# Patient Record
Sex: FEMALE | Race: WHITE | NOT HISPANIC OR LATINO | Employment: PART TIME | URBAN - METROPOLITAN AREA
[De-identification: names, ages, dates, MRNs, and addresses within clinical notes are randomized per-mention and may not be internally consistent; named-entity substitution may affect disease eponyms.]

---

## 2018-06-21 ENCOUNTER — HOSPITAL ENCOUNTER (OUTPATIENT)
Dept: CT IMAGING | Facility: CLINIC | Age: 73
Discharge: HOME | End: 2018-06-21
Attending: PHYSICIAN ASSISTANT

## 2018-06-21 ENCOUNTER — COMMUNICATION - HEALTHEAST (OUTPATIENT)
Dept: UROLOGY | Facility: CLINIC | Age: 73
End: 2018-06-21

## 2018-06-21 ENCOUNTER — AMBULATORY - HEALTHEAST (OUTPATIENT)
Dept: UROLOGY | Facility: CLINIC | Age: 73
End: 2018-06-21

## 2018-06-21 ENCOUNTER — OFFICE VISIT - HEALTHEAST (OUTPATIENT)
Dept: UROLOGY | Facility: CLINIC | Age: 73
End: 2018-06-21

## 2018-06-21 DIAGNOSIS — N12 PYELONEPHRITIS: ICD-10-CM

## 2018-06-21 DIAGNOSIS — N20.9 URINARY TRACT STONES: ICD-10-CM

## 2018-06-21 DIAGNOSIS — R10.9 RIGHT FLANK PAIN: ICD-10-CM

## 2018-06-21 LAB
ALBUMIN UR-MCNC: ABNORMAL MG/DL
APPEARANCE UR: ABNORMAL
BILIRUB UR QL STRIP: NEGATIVE
COLOR UR AUTO: YELLOW
GLUCOSE UR STRIP-MCNC: NEGATIVE MG/DL
HGB UR QL STRIP: ABNORMAL
KETONES UR STRIP-MCNC: NEGATIVE MG/DL
LEUKOCYTE ESTERASE UR QL STRIP: ABNORMAL
NITRATE UR QL: POSITIVE
PH UR STRIP: 7.5 [PH] (ref 5–8)
SP GR UR STRIP: 1.02 (ref 1–1.03)
UROBILINOGEN UR STRIP-ACNC: ABNORMAL

## 2018-06-21 ASSESSMENT — MIFFLIN-ST. JEOR: SCORE: 1017.98

## 2018-06-23 LAB — BACTERIA SPEC CULT: ABNORMAL

## 2018-08-17 ENCOUNTER — THERAPY VISIT (OUTPATIENT)
Dept: PHYSICAL THERAPY | Facility: CLINIC | Age: 73
End: 2018-08-17
Payer: COMMERCIAL

## 2018-08-17 DIAGNOSIS — M76.62 LEFT ACHILLES TENDINITIS: Primary | ICD-10-CM

## 2018-08-17 PROCEDURE — 97110 THERAPEUTIC EXERCISES: CPT | Mod: GP | Performed by: PHYSICAL THERAPIST

## 2018-08-17 PROCEDURE — 97140 MANUAL THERAPY 1/> REGIONS: CPT | Mod: GP | Performed by: PHYSICAL THERAPIST

## 2018-08-17 PROCEDURE — 97033 APP MDLTY 1+IONTPHRSIS EA 15: CPT | Mod: GP | Performed by: PHYSICAL THERAPIST

## 2018-08-17 PROCEDURE — G8979 MOBILITY GOAL STATUS: HCPCS | Mod: GP | Performed by: PHYSICAL THERAPIST

## 2018-08-17 PROCEDURE — 97161 PT EVAL LOW COMPLEX 20 MIN: CPT | Mod: GP | Performed by: PHYSICAL THERAPIST

## 2018-08-17 PROCEDURE — G8978 MOBILITY CURRENT STATUS: HCPCS | Mod: GP | Performed by: PHYSICAL THERAPIST

## 2018-08-17 NOTE — LETTER
[facility]  [address]  [place] [address]  [phone]    2018    Re: Veda Hicks   :   1945  MRN:  [id number]   REFERRING PHYSICIAN:   [facility]    Date of Initial Evaluation: 2018  Visits:  Rxs Used: 1  Reason for Referral:  Left Achilles tendinitis    EVALUATION SUMMARY    [facility] Initial Evaluation  Subjective:  Patient is a 73 year old female presenting with rehab left ankle/foot hpi.   Veda Hicks is a 73 year old female with a left ankle condition.  Occurance: a bad step.  Condition occurred: at home.  This is a new condition  18 patient received MD orders for PT for left achilles tendon pain that started about 6 weeks ago when she stepped awkwardly.  She has been in a walking boot the last several weeks but is able to taper out of it now..    Patient reports pain:  Posterior (achilles tendon).  Radiates to:  Lower leg.  Pain is described as aching and burning and is intermittent and reported as 3/10.  Associated symptoms:  Loss of motion/stiffness, edema and loss of strength. Pain is the same all the time.  Symptoms are exacerbated by ascending stairs, descending stairs, bending/squatting and walking and relieved by bracing/immobilizing and rest.  Since onset symptoms are gradually improving.  Special tests:  X-ray and MRI.      General health as reported by patient is excellent.  Pertinent medical history includes:  Cancer and chemical dependency. Medical allergies: no. Other surgeries include:  Cancer surgery (many years ago). Current medications:  Other (diuretic).  Current occupation is Addiction counselor.  Patient is working in normal job with restrictions (boot). Primary job tasks include:  Driving and prolonged sitting.  Barriers include:  None as reported by patient.  Red flags:  None as reported by patient.    Objective:  ANKLE:   PROM L PROM R AROM L AROM R MMT L MMT R    Dorsiflexion 10 20 10 20 4/5 5/5   Plantarflexion 35 45 35 45 4/5 5/5   Inversion 30 40 30 40 4/5 5/5   Eversion 10 15 10 15 4/5 5/5   Edema:    General: thickening of left achilles tendon  Palpation: tender throughout left achilles tendon  Gait: presents in CAM boot    Re: Veda Hicks   :   1945          Special Tests:   L R   Anterior Drawer - -   Posterior Drawer - -   Valgus Stress - -   Varus Stress - -   External Rotation - -   Berg's (Achilles) - -   Elkin's - -     Assessment/Plan:    Patient is a 73 year old female with left side ankle complaints.    Patient has the following significant findings with corresponding treatment plan.                Diagnosis 1:  Left achilles tendinopathy    Pain -  hot/cold therapy, US, manual therapy, splint/taping/bracing/orthotics, self management, education and home program  Decreased ROM/flexibility - manual therapy, therapeutic exercise and home program  Decreased strength - therapeutic exercise, therapeutic activities and home program  Decreased proprioception - neuro re-education, therapeutic activities and home program  Inflammation - cold therapy, US, iontophoresis and self management/home program  Edema - cold therapy and self management/home program  Impaired gait - gait training and home program  Decreased function - therapeutic activities and home program    Therapy Evaluation Codes:   1) History comprised of:   Personal factors that impact the plan of care:      None.    Comorbidity factors that impact the plan of care are:      Cancer, Chemical Dependency and Smoking.     Medications impacting care: diuretic.  2) Examination of Body Systems comprised of:   Body structures and functions that impact the plan of care:      Ankle.   Activity limitations that impact the plan of care are:      Squatting/kneeling, Stairs and Walking.  3) Clinical presentation characteristics are:   Stable/Uncomplicated.  4) Decision-Making    Low complexity using  standardized patient assessment instrument and/or measureable assessment of functional outcome.  Cumulative Therapy Evaluation is: Low complexity.    Previous and current functional limitations:  (See Goal Flow Sheet for this information)    Short term and Long term goals: (See Goal Flow Sheet for this information)     Re: Veda Hicks   :   1945          Communication ability:  Patient appears to be able to clearly communicate and understand verbal and written communication and follow directions correctly.  Treatment Explanation - The following has been discussed with the patient:   RX ordered/plan of care  Anticipated outcomes  Possible risks and side effects  This patient would benefit from PT intervention to resume normal activities.   Rehab potential is good.    Frequency:  2 X week, once daily  Duration:  for 6 weeks per MD order  Discharge Plan:  Achieve all LTG.  Independent in home treatment program.  Reach maximal therapeutic benefit.    Thank you for your referral.      INQUIRIES  Therapist: Al San, PT   [facility]  [address] [place] [address]  Phone: [phone]  Fax: [phone]

## 2018-08-17 NOTE — PROGRESS NOTES
[facility] Initial Evaluation  Subjective:  Patient is a 73 year old female presenting with rehab left ankle/foot hpi.   Veda Hicks is a 73 year old female with a left ankle condition.  Occurance: a bad step.  Condition occurred: at home.  This is a new condition  8/8/18 patient received MD orders for PT for left achilles tendon pain that started about 6 weeks ago when she stepped awkwardly.  She has been in a walking boot the last several weeks but is able to taper out of it now..    Patient reports pain:  Posterior (achilles tendon).  Radiates to:  Lower leg.  Pain is described as aching and burning and is intermittent and reported as 3/10.  Associated symptoms:  Loss of motion/stiffness, edema and loss of strength. Pain is the same all the time.  Symptoms are exacerbated by ascending stairs, descending stairs, bending/squatting and walking and relieved by bracing/immobilizing and rest.  Since onset symptoms are gradually improving.  Special tests:  X-ray and MRI.      General health as reported by patient is excellent.  Pertinent medical history includes:  Cancer and chemical dependency.  Medical allergies: no.  Other surgeries include:  Cancer surgery (many years ago).  Current medications:  Other (diuretic).  Current occupation is Addiction counselor.  Patient is working in normal job with restrictions (boot).  Primary job tasks include:  Driving and prolonged sitting.    Barriers include:  None as reported by patient.    Red flags:  None as reported by patient.                        Objective:  ANKLE:     PROM L PROM R AROM L AROM R MMT L MMT R   Dorsiflexion 10 20 10 20 4/5 5/5   Plantarflexion 35 45 35 45 4/5 5/5   Inversion 30 40 30 40 4/5 5/5   Eversion 10 15 10 15 4/5 5/5     Edema:    General: thickening of left achilles tendon      Palpation: tender throughout left achilles tendon    Gait: presents in CAM boot    Special Tests:   L R   Anterior Drawer - -   Posterior Drawer - -    Valgus Stress - -   Varus Stress - -   External Rotation - -   Berg's (Achilles) - -   Elkin's - -             System    Physical Exam    General     ROS    Assessment/Plan:    Patient is a 73 year old female with left side ankle complaints.    Patient has the following significant findings with corresponding treatment plan.                Diagnosis 1:  Left achilles tendinopathy    Pain -  hot/cold therapy, US, manual therapy, splint/taping/bracing/orthotics, self management, education and home program  Decreased ROM/flexibility - manual therapy, therapeutic exercise and home program  Decreased strength - therapeutic exercise, therapeutic activities and home program  Decreased proprioception - neuro re-education, therapeutic activities and home program  Inflammation - cold therapy, US, iontophoresis and self management/home program  Edema - cold therapy and self management/home program  Impaired gait - gait training and home program  Decreased function - therapeutic activities and home program    Therapy Evaluation Codes:   1) History comprised of:   Personal factors that impact the plan of care:      None.    Comorbidity factors that impact the plan of care are:      Cancer, Chemical Dependency and Smoking.     Medications impacting care: diuretic.  2) Examination of Body Systems comprised of:   Body structures and functions that impact the plan of care:      Ankle.   Activity limitations that impact the plan of care are:      Squatting/kneeling, Stairs and Walking.  3) Clinical presentation characteristics are:   Stable/Uncomplicated.  4) Decision-Making    Low complexity using standardized patient assessment instrument and/or measureable assessment of functional outcome.  Cumulative Therapy Evaluation is: Low complexity.    Previous and current functional limitations:  (See Goal Flow Sheet for this information)    Short term and Long term goals: (See Goal Flow Sheet for this information)     Communication  ability:  Patient appears to be able to clearly communicate and understand verbal and written communication and follow directions correctly.  Treatment Explanation - The following has been discussed with the patient:   RX ordered/plan of care  Anticipated outcomes  Possible risks and side effects  This patient would benefit from PT intervention to resume normal activities.   Rehab potential is good.    Frequency:  2 X week, once daily  Duration:  for 6 weeks per MD order  Discharge Plan:  Achieve all LTG.  Independent in home treatment program.  Reach maximal therapeutic benefit.    Please refer to the daily flowsheet for treatment today, total treatment time and time spent performing 1:1 timed codes.

## 2019-09-13 PROBLEM — M76.62 LEFT ACHILLES TENDINITIS: Status: RESOLVED | Noted: 2018-08-17 | Resolved: 2019-09-13

## 2019-09-13 NOTE — PROGRESS NOTES
Patient did not return for follow up PT so current status is unknown.  Refer to initial evaluation for last known objective/subjective information and functional status.  Patient is discharged from PT.

## 2021-01-06 ENCOUNTER — AMBULATORY - HEALTHEAST (OUTPATIENT)
Dept: UROLOGY | Facility: CLINIC | Age: 76
End: 2021-01-06

## 2021-01-06 ENCOUNTER — HOSPITAL ENCOUNTER (OUTPATIENT)
Dept: CT IMAGING | Facility: CLINIC | Age: 76
Discharge: HOME | End: 2021-01-06
Attending: PHYSICIAN ASSISTANT

## 2021-01-06 ENCOUNTER — OFFICE VISIT - HEALTHEAST (OUTPATIENT)
Dept: UROLOGY | Facility: CLINIC | Age: 76
End: 2021-01-06

## 2021-01-06 DIAGNOSIS — N23 RENAL COLIC: ICD-10-CM

## 2021-01-06 DIAGNOSIS — F10.21 ALCOHOL DEPENDENCE IN REMISSION (H): ICD-10-CM

## 2021-01-06 DIAGNOSIS — N20.0 CALCULUS OF KIDNEY: ICD-10-CM

## 2021-01-06 DIAGNOSIS — N20.1 CALCULUS OF URETER: ICD-10-CM

## 2021-01-07 ENCOUNTER — COMMUNICATION - HEALTHEAST (OUTPATIENT)
Dept: UROLOGY | Facility: CLINIC | Age: 76
End: 2021-01-07

## 2021-01-07 DIAGNOSIS — N20.0 CALCULUS OF KIDNEY: ICD-10-CM

## 2021-01-08 ENCOUNTER — AMBULATORY - HEALTHEAST (OUTPATIENT)
Dept: LAB | Facility: HOSPITAL | Age: 76
End: 2021-01-08

## 2021-01-08 ENCOUNTER — OFFICE VISIT - HEALTHEAST (OUTPATIENT)
Dept: UROLOGY | Facility: CLINIC | Age: 76
End: 2021-01-08

## 2021-01-08 ENCOUNTER — HOSPITAL ENCOUNTER (OUTPATIENT)
Dept: CT IMAGING | Facility: HOSPITAL | Age: 76
Discharge: HOME | End: 2021-01-08

## 2021-01-08 DIAGNOSIS — R82.81 PYURIA: ICD-10-CM

## 2021-01-08 DIAGNOSIS — N23 RENAL COLIC: ICD-10-CM

## 2021-01-08 DIAGNOSIS — F10.21 ALCOHOL DEPENDENCE IN REMISSION (H): ICD-10-CM

## 2021-01-08 DIAGNOSIS — N18.30 ACUTE WORSENING OF STAGE 3 CHRONIC KIDNEY DISEASE (H): ICD-10-CM

## 2021-01-08 DIAGNOSIS — N20.0 CALCULUS OF KIDNEY: ICD-10-CM

## 2021-01-08 DIAGNOSIS — N18.30 CRF (CHRONIC RENAL FAILURE), STAGE 3 (MODERATE) (H): ICD-10-CM

## 2021-01-08 DIAGNOSIS — N20.1 CALCULUS OF URETER: ICD-10-CM

## 2021-01-08 LAB
ALBUMIN UR-MCNC: ABNORMAL MG/DL
AMORPH CRY #/AREA URNS HPF: ABNORMAL /[HPF]
ANION GAP SERPL CALCULATED.3IONS-SCNC: 10 MMOL/L (ref 5–18)
APPEARANCE UR: ABNORMAL
BACTERIA #/AREA URNS HPF: ABNORMAL HPF
BILIRUB UR QL STRIP: NEGATIVE
BUN SERPL-MCNC: 23 MG/DL (ref 8–28)
CALCIUM SERPL-MCNC: 9 MG/DL (ref 8.5–10.5)
CHLORIDE BLD-SCNC: 116 MMOL/L (ref 98–107)
CO2 SERPL-SCNC: 18 MMOL/L (ref 22–31)
COLOR UR AUTO: YELLOW
CREAT SERPL-MCNC: 2.06 MG/DL (ref 0.6–1.1)
GFR SERPL CREATININE-BSD FRML MDRD: 24 ML/MIN/1.73M2
GLUCOSE BLD-MCNC: 76 MG/DL (ref 70–125)
GLUCOSE UR STRIP-MCNC: NEGATIVE MG/DL
HGB UR QL STRIP: ABNORMAL
KETONES UR STRIP-MCNC: NEGATIVE MG/DL
LEUKOCYTE ESTERASE UR QL STRIP: ABNORMAL
MUCOUS THREADS #/AREA URNS LPF: ABNORMAL LPF
NITRATE UR QL: NEGATIVE
PH UR STRIP: 6.5 [PH] (ref 4.5–8)
POTASSIUM BLD-SCNC: 3.8 MMOL/L (ref 3.5–5)
RBC #/AREA URNS AUTO: ABNORMAL HPF
SODIUM SERPL-SCNC: 144 MMOL/L (ref 136–145)
SP GR UR STRIP: 1.01 (ref 1–1.03)
SQUAMOUS #/AREA URNS AUTO: >100 LPF
UROBILINOGEN UR STRIP-ACNC: ABNORMAL
WBC #/AREA URNS AUTO: >100 HPF

## 2021-01-09 LAB — BACTERIA SPEC CULT: NO GROWTH

## 2021-01-10 ENCOUNTER — COMMUNICATION - HEALTHEAST (OUTPATIENT)
Dept: UROLOGY | Facility: CLINIC | Age: 76
End: 2021-01-10

## 2021-01-15 ENCOUNTER — COMMUNICATION - HEALTHEAST (OUTPATIENT)
Dept: UROLOGY | Facility: CLINIC | Age: 76
End: 2021-01-15

## 2021-01-29 ENCOUNTER — AMBULATORY - HEALTHEAST (OUTPATIENT)
Dept: NURSING | Facility: CLINIC | Age: 76
End: 2021-01-29

## 2021-02-19 ENCOUNTER — AMBULATORY - HEALTHEAST (OUTPATIENT)
Dept: NURSING | Facility: CLINIC | Age: 76
End: 2021-02-19

## 2021-05-31 ENCOUNTER — RECORDS - HEALTHEAST (OUTPATIENT)
Dept: ADMINISTRATIVE | Facility: CLINIC | Age: 76
End: 2021-05-31

## 2021-06-01 ENCOUNTER — RECORDS - HEALTHEAST (OUTPATIENT)
Dept: ADMINISTRATIVE | Facility: CLINIC | Age: 76
End: 2021-06-01

## 2021-06-01 VITALS — WEIGHT: 107 LBS | BODY MASS INDEX: 16.79 KG/M2 | HEIGHT: 67 IN

## 2021-06-14 NOTE — TELEPHONE ENCOUNTER
Called pt, left message , urine cult no growth see after results of litholink studies back- pt to call if she has more pain

## 2021-06-14 NOTE — TELEPHONE ENCOUNTER
Patient returned call and was given message about test kits coming to her home.  Nasima Pacheco RN    
No

## 2021-06-14 NOTE — TELEPHONE ENCOUNTER
Per provider, patient to have a CT regular dose to evaluate.  Order placed. Spoke with patient and was able to schedule her CT and f/u call.  She will go into the lab also at that time and have her blood drawn and urine dropped off.  Nasima Pacheco RN

## 2021-06-14 NOTE — PROGRESS NOTES
Assessment/Plan:        Diagnoses and all orders for this visit:    Pyuria  -     Culture, Urine- Standing; Standing    Acute worsening of stage 3 chronic kidney disease    Calculus of kidney  Litholink 24-hour urine studies x2    Stone Management Plan  [facility] Stone Management 6/21/2018 1/6/2021 1/8/2021   Urinary Tract Infection Suspected Infection No suspicion of infection Possible Infection   Renal Colic Well controlled symptoms Well controlled symptoms Well controlled symptoms   Renal Failure No suspicion of renal failure No suspicion of renal failure Chronic renal failure   Chronic Renal Failure - - 15-30ml/min/1.73m2   Current CT date 6/21/2018 1/6/2021 1/8/2021   Right sided stones? No Yes Yes   R Number of ureteral stones - No ureteral stones No ureteral stones   R Number of kidney stones  - Renal stones unchanged from last exam 2   R GSD of kidney stones - - < 2   R Hydronephrosis - - Mild   R Stone Event No current event No current event No current event   Diagnosis date - - -   Initial location of primary symptomatic stone - - -   Initial GSD of primary symptomatic stone - - -   Resolved date - - -   R Post-op status - - -   R Current Plan - - Observe   Clear rationale - - -   Observe rationale - - Limited stone burden with good prognosis for spontaneous passage   Left sided stones? No Yes Yes   L Number of ureteral stones - (No Data) No ureteral stones   L GSD of ureteral stones - (No Data) -   L Location of ureteral stone - - -   L Number of kidney stones  - 5 8   L GSD of kidney stones - 4 - 10 (No Data)   L Hydronephrosis - (No Data) None   L Stone Event No current event (No Data) -   Diagnosis date - - -   Initial location of primary symptomatic stone - - -   Initial GSD of primary symptomatic stone - - -   Resolved date - - -   Post-op status - - -   L MET Status - - -   L Current Plan - - Observe   MET - - -   Clear rationale - - -   Observe rationale - - Limited stone burden with good prognosis for  "spontaneous passage             Phone call duration: 6 minutes    Harlan Rubio MD     Subjective:      The patient has been notified of following:     \"This telephone visit will be conducted via a call between you and your physician/provider. We have found that certain health care needs can be provided without the need for a physical exam.  This service lets us provide the care you need with a short phone conversation.  If a prescription is necessary we can send it directly to your pharmacy.  If labs and/or imaging are needed, we can place orders so you can have the test (s) done at a later time.    If during the course of the call the physician/provider feels a telephone visit is not appropriate, you will not be charged for this service.\"     HPI  Ms. Veda Hicks is a 76 y.o.  female who is being evaluated via a billable telephone visit by [facility] for follow-up on visit from 1/6/2021.  At that time patient was having left flank pain.  CT scan was indeterminate by my reading.  Radiology reported no evidence of hydro no evidence of ureteral stone.  There appeared to be a bit of a hydro on my side.  Asked radiology to review it and the radiologist who who reviewed it yesterday morning thought there was a left ureteral stone and there was some hydronephrosis.    Subsequently had a full dose CT accomplished today.  Personal review of CT full dose without contrast obtained today 1/8/2021 demonstrated 8 to 10 stones in the left hip kidney with a cluster of stones in the lower pole and the largest stone being 4 mm.  Right kidney contained Pap tip stones 1 to 3 mm x 2 or 3.  There was no sign of hydronephrosis today and no ureteral stone.    Radiology report stated stone from 1/6/2021 with hydronephrosis was now gone.    Called patient and she  said her pain is better.    Urine analysis from this morning demonstrated specific gravity 1.013 pH 6.5 small blood negative nitrate large " leukocyte few bacteria 10-25 RBCs greater than 100 WBCs.    BMP demonstrated sodium 144 potassium 3.8 chloride 116 CO2 18 glucose 76 calcium 9.0 creatinine 2.06 BUN 23.    Creatinine has gone from 1.27 in 2015-1.8 in 2016 to the current 2.06 and 1/8/2021.    Patient has been followed by nephrology with creatinines in August 2018 2.1 January 2019 1.95 June 2019 2.0.    Impression known bilateral renal stones left more so than right   Recent passage of the left ureteral stone not recovered  Pyuria etiology unclear  CRF stage III    Plan patient is a known stone former with history of stone risk studies in the remote past.  Recommended we proceed with repeat Litholink 24-hour urine studies.  With pyuria will have urine culture done on specimen from this morning.    Patient was comfortable with the above.             ROS   Review of systems is negative except for HPI.    Past Medical History:   Diagnosis Date     Cancer (H) 1978    Cervical     COPD (chronic obstructive pulmonary disease) (H)      H/O alcohol dependence (H)      Kidney calculus      Kidney stone      Pseudomembranous colitis      Tobacco abuse      Uterine cancer (H)        Past Surgical History:   Procedure Laterality Date     HYSTERECTOMY  1978     LITHOTRIPSY       NEPHROLITHOTOMY       open kidney surgery       URETEROSCOPY         Current Outpatient Medications   Medication Sig Dispense Refill     ondansetron (ZOFRAN-ODT) 4 MG disintegrating tablet Take 1 tablet (4 mg total) by mouth every 6 (six) hours as needed for nausea. 10 tablet 1     oxyCODONE (ROXICODONE) 5 MG immediate release tablet Take 1 tablet (5 mg total) by mouth every 4 (four) hours as needed for pain. 10 tablet 0     No current facility-administered medications for this visit.        No Known Allergies    Social History     Socioeconomic History     Marital status:      Spouse name: Not on file     Number of children: Not on file     Years of education: Not on file      Highest education level: Not on file   Occupational History     Occupation: [employer]     Employer: [employer]   Social Needs     Financial resource strain: Not on file     Food insecurity     Worry: Not on file     Inability: Not on file     Transportation needs     Medical: Not on file     Non-medical: Not on file   Tobacco Use     Smoking status: Current Every Day Smoker     Packs/day: 1.00     Years: 47.00     Pack years: 47.00     Smokeless tobacco: Never Used   Substance and Sexual Activity     Alcohol use: No     Drug use: No     Sexual activity: Not on file   Lifestyle     Physical activity     Days per week: Not on file     Minutes per session: Not on file     Stress: Not on file   Relationships     Social connections     Talks on phone: Not on file     Gets together: Not on file     Attends Sabianism service: Not on file     Active member of club or organization: Not on file     Attends meetings of clubs or organizations: Not on file     Relationship status: Not on file     Intimate partner violence     Fear of current or ex partner: Not on file     Emotionally abused: Not on file     Physically abused: Not on file     Forced sexual activity: Not on file   Other Topics Concern     Not on file   Social History Narrative     Not on file       Family History   Problem Relation Age of Onset     Cancer Father         lung     Prostate cancer Brother      Prostate cancer Maternal Grandmother         kidney     Prostate cancer Maternal Grandfather         kidney     Heart disease Paternal Grandfather        Objective:      Labs   Urinalysis POC (Office):  Blood UA   Date Value Ref Range Status   12/19/2016 Large Negative Final   12/06/2016 Moderate Negative Final   01/28/2016 Trace Negative Final     Nitrite, UA   Date Value Ref Range Status   01/08/2021 Negative Negative Final   06/21/2018 Positive (!) Negative Final   12/19/2016 Negative Negative Final   12/06/2016 Negative Negative Final    01/28/2016 Negative Negative Final   05/26/2015 Negative Negative Final     Leukocytes, UA    Date Value Ref Range Status   12/19/2016 Small (!) Negative Final   12/06/2016 Trace (!) Negative Final   01/28/2016 Negative Negative Final     pH UA   Date Value Ref Range Status   12/19/2016 6.5 4.5 - 8.0 Final   12/06/2016 6.0 4.5 - 8.0 Final   01/28/2016 6.0 4.5 - 8.0 Final       Lab Urinalysis:  Blood, UA   Date Value Ref Range Status   01/08/2021 Small (!) Negative Final   06/21/2018 Large (!) Negative Final   05/26/2015 Negative Negative Final     Nitrite, UA   Date Value Ref Range Status   01/08/2021 Negative Negative Final   06/21/2018 Positive (!) Negative Final   12/19/2016 Negative Negative Final   12/06/2016 Negative Negative Final   01/28/2016 Negative Negative Final   05/26/2015 Negative Negative Final     Leukocytes, UA   Date Value Ref Range Status   01/08/2021 Large (!) Negative Final   06/21/2018 Large (!) Negative Final   05/26/2015 Negative Negative Final     pH, UA   Date Value Ref Range Status   01/08/2021 6.5 4.5 - 8.0 Final   06/21/2018 7.5 5.0 - 8.0 Final   05/26/2015 6.0 4.5 - 8.0 Final    and Acute Labs   Renal Panel  [facility]  Creatinine   Date Value Ref Range Status   01/08/2021 2.06 (H) 0.60 - 1.10 mg/dL Final   12/09/2016 1.80 (H) 0.60 - 1.10 mg/dL Final   05/28/2015 1.27 (H) 0.60 - 1.10 mg/dL Final     Potassium   Date Value Ref Range Status   01/08/2021 3.8 3.5 - 5.0 mmol/L Final   12/09/2016 3.4 (L) 3.5 - 5.0 mmol/L Final   05/28/2015 3.7 3.5 - 5.0 mmol/L Final     Calcium   Date Value Ref Range Status   01/08/2021 9.0 8.5 - 10.5 mg/dL Final   12/09/2016 9.4 8.5 - 10.5 mg/dL Final   05/28/2015 8.0 (L) 8.5 - 10.5 mg/dL Final

## 2021-06-14 NOTE — PROGRESS NOTES
Assessment/Plan:        Diagnoses and all orders for this visit:    Renal colic  -     ondansetron (ZOFRAN-ODT) 4 MG disintegrating tablet; Take 1 tablet (4 mg total) by mouth every 6 (six) hours as needed for nausea.  Dispense: 10 tablet; Refill: 1  -     oxyCODONE (ROXICODONE) 5 MG immediate release tablet; Take 1 tablet (5 mg total) by mouth every 4 (four) hours as needed for pain.  Dispense: 10 tablet; Refill: 0  -     Urinalysis Macro & Micro; Standing  -     Basic Metabolic Panel; Standing    Alcohol dependence in remission (H)  -     ondansetron (ZOFRAN-ODT) 4 MG disintegrating tablet; Take 1 tablet (4 mg total) by mouth every 6 (six) hours as needed for nausea.  Dispense: 10 tablet; Refill: 1  -     oxyCODONE (ROXICODONE) 5 MG immediate release tablet; Take 1 tablet (5 mg total) by mouth every 4 (four) hours as needed for pain.  Dispense: 10 tablet; Refill: 0  -     Urinalysis Macro & Micro; Standing  -     Basic Metabolic Panel; Standing    Calculus of kidney  -     ondansetron (ZOFRAN-ODT) 4 MG disintegrating tablet; Take 1 tablet (4 mg total) by mouth every 6 (six) hours as needed for nausea.  Dispense: 10 tablet; Refill: 1  -     oxyCODONE (ROXICODONE) 5 MG immediate release tablet; Take 1 tablet (5 mg total) by mouth every 4 (four) hours as needed for pain.  Dispense: 10 tablet; Refill: 0  -     Urinalysis Macro & Micro; Standing  -     Basic Metabolic Panel; Standing    Calculus of ureter  -     ondansetron (ZOFRAN-ODT) 4 MG disintegrating tablet; Take 1 tablet (4 mg total) by mouth every 6 (six) hours as needed for nausea.  Dispense: 10 tablet; Refill: 1  -     oxyCODONE (ROXICODONE) 5 MG immediate release tablet; Take 1 tablet (5 mg total) by mouth every 4 (four) hours as needed for pain.  Dispense: 10 tablet; Refill: 0  -     Urinalysis Macro & Micro; Standing  -     Basic Metabolic Panel; Standing    Other orders  -     Discontinue: hydroCHLOROthiazide (HYDRODIURIL) 25 MG tablet      Stone  "Management Plan  [facility] Stone Management 12/19/2016 6/21/2018 1/6/2021   Urinary Tract Infection No suspicion of infection Suspected Infection No suspicion of infection   Renal Colic Inadequate outpatient management of symptoms Well controlled symptoms Well controlled symptoms   Renal Failure No suspicion of renal failure No suspicion of renal failure No suspicion of renal failure   Chronic Renal Failure - - -   Current CT date - 6/21/2018 1/6/2021   Right sided stones? - No Yes   R Number of ureteral stones - - No ureteral stones   R Number of kidney stones  - - Renal stones unchanged from last exam   R GSD of kidney stones - - -   R Hydronephrosis - - -   R Stone Event No current event No current event No current event   Diagnosis date - - -   Initial location of primary symptomatic stone - - -   Initial GSD of primary symptomatic stone - - -   Resolved date - - -   R Post-op status - - -   R Current Plan - - -   Clear rationale - - -   Observe rationale - - -   Left sided stones? - No Yes   L Number of ureteral stones - - (No Data)   L GSD of ureteral stones - - (No Data)   L Location of ureteral stone - - -   L Number of kidney stones  - - 5   L GSD of kidney stones - - 4 - 10   L Hydronephrosis - - (No Data)   L Stone Event Established event No current event (No Data)   Diagnosis date - - -   Initial location of primary symptomatic stone - - -   Initial GSD of primary symptomatic stone - - -   Resolved date - - -   Post-op status Stent Removal - -   L MET Status - - -   L Current Plan - - -   MET - - -   Clear rationale - - -   Observe rationale - - -             Phone call duration: 7 minutes    Harlan Rubio MD     Subjective:      The patient has been notified of following:     \"This telephone visit will be conducted via a call between you and your physician/provider. We have found that certain health care needs can be provided without the need for a physical exam.  This service lets us provide the care " "you need with a short phone conversation.  If a prescription is necessary we can send it directly to your pharmacy.  If labs and/or imaging are needed, we can place orders so you can have the test (s) done at a later time.    If during the course of the call the physician/provider feels a telephone visit is not appropriate, you will not be charged for this service.\"     HPI  Ms. Veda Hicks is a 76 y.o.  female who is being evaluated via a billable telephone visit by [facility] [place] with long history of stones recurrent risk factors unknown.  Patient had onset of left flank pain today for which she took ibuprofen with some relief.  She had some mild nausea associated with it.  She stated it was reminiscent of her previous stones.    Personal review of CT scan without contrast low-dose obtained today 1/6/2021 demonstrated a 2 mm stone in the right kidney with 5 stones on the left 2 of which were 2 mm each along with a 5 mm stone and then 2 stones that overlapped 1 3.5 mm the other 4.7 mm in the lower pole.  It appeared that there was either parapelvic cysts or hydronephrosis but I am uncertain.  Postop hip gave scatter made it more confusing for evaluation of ureter in this area  Plan to review with radiologist.  There was no evidence of a ureteral stone that I could see possibly some mild ureterectasis on the left.    Impression bilateral renal stones  Left flank pain reminiscent of stone by patient history possible small ureteral stone that we cannot identify    Plan review CT with radiology have patient get UA and BMP which she says she can do on Friday.   Patient given 10 oxycodone and Zofran for symptom control to use in addition to her ibuprofen.  Will call patient tomorrow with results of consultation with radiology.    1/7/2020 addendum;  Spoke with radiologist doing CT at [facility] this morning at around 08 40.  Asked him to look at films as I was concerned about possibility of " hydronephrosis but did not have sufficient clarity on the CT that I had.    He reviewed the CT and concurred that it appeared to be a hydro and he thought he could see left ureteral stone.  Told him I would reorder full dose CT to see if we could get better definition of her circumstance in view of the left flank pain.  At the time of this dictation I am in the process of trying to reach the patient by phone.  At the moment she does not answer.     ROS   Review of systems is negative except for HPI.    Past Medical History:   Diagnosis Date     Cancer (H) 1978    Cervical     COPD (chronic obstructive pulmonary disease) (H)      H/O alcohol dependence (H)      Kidney calculus      Kidney stone      Pseudomembranous colitis      Tobacco abuse      Uterine cancer (H)        Past Surgical History:   Procedure Laterality Date     HYSTERECTOMY  1978     LITHOTRIPSY       NEPHROLITHOTOMY       open kidney surgery       URETEROSCOPY         Current Outpatient Medications   Medication Sig Dispense Refill     ondansetron (ZOFRAN-ODT) 4 MG disintegrating tablet Take 1 tablet (4 mg total) by mouth every 6 (six) hours as needed for nausea. 10 tablet 1     oxyCODONE (ROXICODONE) 5 MG immediate release tablet Take 1 tablet (5 mg total) by mouth every 4 (four) hours as needed for pain. 10 tablet 0     No current facility-administered medications for this visit.        No Known Allergies    Social History     Socioeconomic History     Marital status:      Spouse name: Not on file     Number of children: Not on file     Years of education: Not on file     Highest education level: Not on file   Occupational History     Occupation: conselor     Employer: [employer]   Social Needs     Financial resource strain: Not on file     Food insecurity     Worry: Not on file     Inability: Not on file     Transportation needs     Medical: Not on file     Non-medical: Not on file   Tobacco Use     Smoking status:  Current Every Day Smoker     Packs/day: 1.00     Years: 47.00     Pack years: 47.00     Smokeless tobacco: Never Used   Substance and Sexual Activity     Alcohol use: No     Drug use: No     Sexual activity: Not on file   Lifestyle     Physical activity     Days per week: Not on file     Minutes per session: Not on file     Stress: Not on file   Relationships     Social connections     Talks on phone: Not on file     Gets together: Not on file     Attends Yarsani service: Not on file     Active member of club or organization: Not on file     Attends meetings of clubs or organizations: Not on file     Relationship status: Not on file     Intimate partner violence     Fear of current or ex partner: Not on file     Emotionally abused: Not on file     Physically abused: Not on file     Forced sexual activity: Not on file   Other Topics Concern     Not on file   Social History Narrative     Not on file       Family History   Problem Relation Age of Onset     Cancer Father         lung     Prostate cancer Brother      Prostate cancer Maternal Grandmother         kidney     Prostate cancer Maternal Grandfather         kidney     Heart disease Paternal Grandfather        Objective:      Labs

## 2021-06-18 NOTE — PROGRESS NOTES
Assessment/Plan:        Diagnoses and all orders for this visit:    Pyelonephritis  -     Discontinue: levoFLOXacin (LEVAQUIN) 500 MG tablet; Take 1 tablet (500 mg total) by mouth daily for 10 days.  Dispense: 10 tablet; Refill: 0  -     levoFLOXacin (LEVAQUIN) 500 MG tablet; Take 1 tablet (500 mg total) by mouth daily for 10 days.  Dispense: 10 tablet; Refill: 0    Urinary tract stones  -     Urinalysis Macroscopic  -     Culture, Urine- Future; Future; Expected date: 7/21/18  -     Culture, Urine- Future      Stone Management Plan  \[facility]\"" Stone Management 12/6/2016 12/19/2016 6/21/2018   Urinary Tract Infection No suspicion of infection No suspicion of infection Suspected Infection   Renal Colic Well controlled symptoms Inadequate outpatient management of symptoms Well controlled symptoms   Renal Failure No suspicion of renal failure No suspicion of renal failure No suspicion of renal failure   Chronic Renal Failure - - -   Current CT date 12/6/2016 - 6/21/2018   Right sided stones? Yes - No   R Number of ureteral stones No ureteral stones - -   R Number of kidney stones  2 - -   R GSD of kidney stones < 2 - -   R Hydronephrosis None - -   R Stone Event No current event No current event No current event   Diagnosis date - - -   Initial location of primary symptomatic stone - - -   Initial GSD of primary symptomatic stone - - -   Resolved date - - -   R Post-op status - - -   R Current Plan Observe - -   Clear rationale - - -   Observe rationale Limited stone burden with good prognosis for spontaneous passage - -   Left sided stones? Yes - No   L Number of ureteral stones 1 - -   L GSD of ureteral stones 4 - -   L Location of ureteral stone Distal - -   L Number of kidney stones  4 - -   L GSD of kidney stones 2 - 4 - -   L Hydronephrosis Severe - -   L Stone Event New event Established event No current event   Diagnosis date 12/6/2016 - -   Initial location of primary symptomatic stone Distal - -   Initial GSD of  primary symptomatic stone 4 - -   Resolved date - - -   Post-op status - Stent Removal -   L MET Status - - -   L Current Plan Clear - -   MET - - -   Clear rationale Poor prognosis - -   Observe rationale - - -             Subjective:      HPI  MsSummer Hicks is a 73 y.o.  female returning to the [facility] [place] for follow up of her stone disease.    Veda has had her stone disease in good control through aggressive hydration for the last two years. She has been having some right flank pain for 2-3 days. She has had hematuria. She thought she might be coming down with UTI last week. Urine has been foul smelling with some dysuria. No fever or chills.    Urine is strongly nitrite positive and is sent for culture.    CT from today demonstrates absence of any stone.    She does have significant right flank pain and I am concerned that she is on the verge of developing full blown pyelonephritis.    I prescribed levaquin and will follow with culture results when they are available. Will see her back in clinic in one month to confirm resolution.     ROS   Review of systems is negative except for HPI.    Past Medical History:   Diagnosis Date     Cancer (H) 1978    Cervical     COPD (chronic obstructive pulmonary disease) (H)      H/O alcohol dependence (H)      Kidney calculus      Kidney stone      Pseudomembranous colitis      Tobacco abuse      Uterine cancer (H)        Past Surgical History:   Procedure Laterality Date     HYSTERECTOMY  1978     LITHOTRIPSY       NEPHROLITHOTOMY       open kidney surgery       URETEROSCOPY         Current Outpatient Prescriptions   Medication Sig Dispense Refill     ibuprofen (ADVIL,MOTRIN) 200 MG tablet Take 200 mg by mouth every 6 (six) hours as needed for pain.       levoFLOXacin (LEVAQUIN) 500 MG tablet Take 1 tablet (500 mg total) by mouth daily for 10 days. 10 tablet 0     No current facility-administered medications for this visit.        No Known  Allergies    Social History     Social History     Marital status:      Spouse name: N/A     Number of children: N/A     Years of education: N/A     Occupational History     [employer]     Social History Main Topics     Smoking status: Current Every Day Smoker     Packs/day: 1.00     Years: 47.00     Smokeless tobacco: Never Used     Alcohol use No     Drug use: No     Sexual activity: Not on file     Other Topics Concern     Not on file     Social History Narrative       Family History   Problem Relation Age of Onset     Cancer Father      lung     Prostate cancer Brother      Prostate cancer Maternal Grandmother      kidney     Prostate cancer Maternal Grandfather      kidney     Heart disease Paternal Grandfather      Objective:      Physical Exam  Vitals:    06/21/18 1505   BP: 109/55   Pulse: 86   Temp: 97.7  F (36.5  C)     General - well developed, well nourished, appropriate for age. Appears no distress at this time  Abdomen - cachetic soft, non-tender, no hepatosplenomegaly, no masses.   - right flank moderate tenderness, no suprapubic tenderness, kidney and bladder non-palpable  MSK - normal spinal curvature. no spinal tenderness. normal gait. muscular strength intact.  Psych - oriented to time, place, and person, normal mood and affect.      Labs   Urinalysis POC (Office):  Blood UA   Date Value Ref Range Status   12/19/2016 Large Negative Final   12/06/2016 Moderate Negative Final   01/28/2016 Trace Negative Final     Nitrite, UA   Date Value Ref Range Status   06/21/2018 Positive (!) Negative Final   12/19/2016 Negative Negative Final   12/06/2016 Negative Negative Final   01/28/2016 Negative Negative Final   05/26/2015 Negative Negative Final   05/21/2015 Negative Negative Final     Leukocytes, UA    Date Value Ref Range Status   12/19/2016 Small (!) Negative Final   12/06/2016 Trace (!) Negative Final   01/28/2016 Negative Negative Final     pH UA   Date Value Ref  Range Status   12/19/2016 6.5 4.5 - 8.0 Final   12/06/2016 6.0 4.5 - 8.0 Final   01/28/2016 6.0 4.5 - 8.0 Final       Lab Urinalysis:  Blood, UA   Date Value Ref Range Status   06/21/2018 Large (!) Negative Final   05/26/2015 Negative Negative Final   05/21/2015 Moderate (!) Negative Final     Nitrite, UA   Date Value Ref Range Status   06/21/2018 Positive (!) Negative Final   12/19/2016 Negative Negative Final   12/06/2016 Negative Negative Final   01/28/2016 Negative Negative Final   05/26/2015 Negative Negative Final   05/21/2015 Negative Negative Final     Leukocytes, UA   Date Value Ref Range Status   06/21/2018 Large (!) Negative Final   05/26/2015 Negative Negative Final   05/21/2015 Large (!) Negative Final     pH, UA   Date Value Ref Range Status   06/21/2018 7.5 5.0 - 8.0 Final   05/26/2015 6.0 4.5 - 8.0 Final   05/21/2015 6.0 4.5 - 8.0 Final    and Acute Labs   Urine Culture    Culture   Date Value Ref Range Status   12/19/2016 No Growth  Final   01/27/2015 No Growth  Final   12/29/2014 10,000-50,000 col/ml Klebsiella pneumoniae (!)  Final

## 2021-09-11 ENCOUNTER — HEALTH MAINTENANCE LETTER (OUTPATIENT)
Age: 76
End: 2021-09-11

## 2022-06-04 ENCOUNTER — APPOINTMENT (OUTPATIENT)
Dept: ULTRASOUND IMAGING | Facility: CLINIC | Age: 77
DRG: 872 | End: 2022-06-04
Attending: EMERGENCY MEDICINE
Payer: COMMERCIAL

## 2022-06-04 ENCOUNTER — HOSPITAL ENCOUNTER (INPATIENT)
Facility: CLINIC | Age: 77
LOS: 7 days | Discharge: HOME | DRG: 872 | End: 2022-06-11
Attending: EMERGENCY MEDICINE | Admitting: INTERNAL MEDICINE
Payer: COMMERCIAL

## 2022-06-04 ENCOUNTER — APPOINTMENT (OUTPATIENT)
Dept: CT IMAGING | Facility: CLINIC | Age: 77
DRG: 872 | End: 2022-06-04
Attending: EMERGENCY MEDICINE
Payer: COMMERCIAL

## 2022-06-04 DIAGNOSIS — N18.9 CHRONIC RENAL IMPAIRMENT, UNSPECIFIED CKD STAGE: ICD-10-CM

## 2022-06-04 DIAGNOSIS — K81.0 ACUTE CHOLECYSTITIS: ICD-10-CM

## 2022-06-04 DIAGNOSIS — Z72.0 TOBACCO ABUSE: ICD-10-CM

## 2022-06-04 DIAGNOSIS — D72.829 LEUKOCYTOSIS, UNSPECIFIED TYPE: ICD-10-CM

## 2022-06-04 DIAGNOSIS — A04.72 C. DIFFICILE COLITIS: Primary | ICD-10-CM

## 2022-06-04 DIAGNOSIS — K51.00 PANCOLITIS (H): ICD-10-CM

## 2022-06-04 DIAGNOSIS — E53.8 FOLATE DEFICIENCY: ICD-10-CM

## 2022-06-04 LAB
ACANTHOCYTES BLD QL SMEAR: ABNORMAL
ALBUMIN SERPL-MCNC: 2.5 G/DL (ref 3.4–5)
ALP SERPL-CCNC: 98 U/L (ref 40–150)
ALT SERPL W P-5'-P-CCNC: 9 U/L (ref 0–50)
ANION GAP SERPL CALCULATED.3IONS-SCNC: 14 MMOL/L (ref 3–14)
ANION GAP SERPL CALCULATED.3IONS-SCNC: 15 MMOL/L (ref 3–14)
AST SERPL W P-5'-P-CCNC: 19 U/L (ref 0–45)
ATRIAL RATE - MUSE: 93 BPM
BASOPHILS # BLD MANUAL: 0 10E3/UL (ref 0–0.2)
BASOPHILS NFR BLD MANUAL: 0 %
BILIRUB SERPL-MCNC: 0.2 MG/DL (ref 0.2–1.3)
BUN SERPL-MCNC: 40 MG/DL (ref 7–30)
BUN SERPL-MCNC: 41 MG/DL (ref 7–30)
CALCIUM SERPL-MCNC: 10.2 MG/DL (ref 8.5–10.1)
CALCIUM SERPL-MCNC: 9.1 MG/DL (ref 8.5–10.1)
CHLORIDE BLD-SCNC: 116 MMOL/L (ref 94–109)
CHLORIDE BLD-SCNC: 120 MMOL/L (ref 94–109)
CO2 SERPL-SCNC: 10 MMOL/L (ref 20–32)
CO2 SERPL-SCNC: 11 MMOL/L (ref 20–32)
CREAT SERPL-MCNC: 2.61 MG/DL (ref 0.52–1.04)
CREAT SERPL-MCNC: 2.79 MG/DL (ref 0.52–1.04)
DIASTOLIC BLOOD PRESSURE - MUSE: NORMAL MMHG
EOSINOPHIL # BLD MANUAL: 0.3 10E3/UL (ref 0–0.7)
EOSINOPHIL NFR BLD MANUAL: 1 %
ERYTHROCYTE [DISTWIDTH] IN BLOOD BY AUTOMATED COUNT: 17 % (ref 10–15)
GFR SERPL CREATININE-BSD FRML MDRD: 17 ML/MIN/1.73M2
GFR SERPL CREATININE-BSD FRML MDRD: 18 ML/MIN/1.73M2
GLUCOSE BLD-MCNC: 69 MG/DL (ref 70–99)
GLUCOSE BLD-MCNC: 94 MG/DL (ref 70–99)
HCT VFR BLD AUTO: 37.5 % (ref 35–47)
HGB BLD-MCNC: 12.1 G/DL (ref 11.7–15.7)
INTERPRETATION ECG - MUSE: NORMAL
LACTATE SERPL-SCNC: 1.5 MMOL/L (ref 0.7–2)
LIPASE SERPL-CCNC: 63 U/L (ref 73–393)
LYMPHOCYTES # BLD MANUAL: 0.6 10E3/UL (ref 0.8–5.3)
LYMPHOCYTES NFR BLD MANUAL: 2 %
MCH RBC QN AUTO: 30.9 PG (ref 26.5–33)
MCHC RBC AUTO-ENTMCNC: 32.3 G/DL (ref 31.5–36.5)
MCV RBC AUTO: 96 FL (ref 78–100)
METAMYELOCYTES # BLD MANUAL: 0.3 10E3/UL
METAMYELOCYTES NFR BLD MANUAL: 1 %
MONOCYTES # BLD MANUAL: 2.8 10E3/UL (ref 0–1.3)
MONOCYTES NFR BLD MANUAL: 9 %
MYELOCYTES # BLD MANUAL: 0.3 10E3/UL
MYELOCYTES NFR BLD MANUAL: 1 %
NEUTROPHILS # BLD MANUAL: 26.9 10E3/UL (ref 1.6–8.3)
NEUTROPHILS NFR BLD MANUAL: 85 %
P AXIS - MUSE: 76 DEGREES
PLAT MORPH BLD: ABNORMAL
PLATELET # BLD AUTO: 324 10E3/UL (ref 150–450)
POLYCHROMASIA BLD QL SMEAR: SLIGHT
POTASSIUM BLD-SCNC: 3.2 MMOL/L (ref 3.4–5.3)
POTASSIUM BLD-SCNC: 3.3 MMOL/L (ref 3.4–5.3)
PR INTERVAL - MUSE: 154 MS
PROMYELOCYTES # BLD MANUAL: 0.3 10E3/UL
PROMYELOCYTES NFR BLD MANUAL: 1 %
PROT SERPL-MCNC: 6.2 G/DL (ref 6.8–8.8)
QRS DURATION - MUSE: 108 MS
QT - MUSE: 404 MS
QTC - MUSE: 502 MS
R AXIS - MUSE: 78 DEGREES
RBC # BLD AUTO: 3.92 10E6/UL (ref 3.8–5.2)
RBC MORPH BLD: ABNORMAL
SARS-COV-2 RNA RESP QL NAA+PROBE: NEGATIVE
SODIUM SERPL-SCNC: 142 MMOL/L (ref 133–144)
SODIUM SERPL-SCNC: 144 MMOL/L (ref 133–144)
SYSTOLIC BLOOD PRESSURE - MUSE: NORMAL MMHG
T AXIS - MUSE: 75 DEGREES
VENTRICULAR RATE- MUSE: 93 BPM
WBC # BLD AUTO: 31.6 10E3/UL (ref 4–11)

## 2022-06-04 PROCEDURE — 258N000002 HC RX IP 258 OP 250: Performed by: INTERNAL MEDICINE

## 2022-06-04 PROCEDURE — 87040 BLOOD CULTURE FOR BACTERIA: CPT | Performed by: EMERGENCY MEDICINE

## 2022-06-04 PROCEDURE — 80053 COMPREHEN METABOLIC PANEL: CPT | Performed by: EMERGENCY MEDICINE

## 2022-06-04 PROCEDURE — 96361 HYDRATE IV INFUSION ADD-ON: CPT

## 2022-06-04 PROCEDURE — 96365 THER/PROPH/DIAG IV INF INIT: CPT

## 2022-06-04 PROCEDURE — 36415 COLL VENOUS BLD VENIPUNCTURE: CPT | Performed by: EMERGENCY MEDICINE

## 2022-06-04 PROCEDURE — 250N000013 HC RX MED GY IP 250 OP 250 PS 637: Performed by: INTERNAL MEDICINE

## 2022-06-04 PROCEDURE — U0003 INFECTIOUS AGENT DETECTION BY NUCLEIC ACID (DNA OR RNA); SEVERE ACUTE RESPIRATORY SYNDROME CORONAVIRUS 2 (SARS-COV-2) (CORONAVIRUS DISEASE [COVID-19]), AMPLIFIED PROBE TECHNIQUE, MAKING USE OF HIGH THROUGHPUT TECHNOLOGIES AS DESCRIBED BY CMS-2020-01-R: HCPCS | Performed by: EMERGENCY MEDICINE

## 2022-06-04 PROCEDURE — 250N000011 HC RX IP 250 OP 636: Performed by: EMERGENCY MEDICINE

## 2022-06-04 PROCEDURE — 999N000128 HC STATISTIC PERIPHERAL IV START W/O US GUIDANCE

## 2022-06-04 PROCEDURE — C9803 HOPD COVID-19 SPEC COLLECT: HCPCS

## 2022-06-04 PROCEDURE — 36415 COLL VENOUS BLD VENIPUNCTURE: CPT | Performed by: INTERNAL MEDICINE

## 2022-06-04 PROCEDURE — 71250 CT THORAX DX C-: CPT

## 2022-06-04 PROCEDURE — 258N000003 HC RX IP 258 OP 636: Performed by: EMERGENCY MEDICINE

## 2022-06-04 PROCEDURE — 85007 BL SMEAR W/DIFF WBC COUNT: CPT | Performed by: EMERGENCY MEDICINE

## 2022-06-04 PROCEDURE — 93005 ELECTROCARDIOGRAM TRACING: CPT | Mod: 76

## 2022-06-04 PROCEDURE — 250N000011 HC RX IP 250 OP 636: Performed by: INTERNAL MEDICINE

## 2022-06-04 PROCEDURE — 83690 ASSAY OF LIPASE: CPT | Performed by: EMERGENCY MEDICINE

## 2022-06-04 PROCEDURE — 250N000009 HC RX 250: Performed by: INTERNAL MEDICINE

## 2022-06-04 PROCEDURE — 99223 1ST HOSP IP/OBS HIGH 75: CPT | Mod: AI | Performed by: INTERNAL MEDICINE

## 2022-06-04 PROCEDURE — 120N000001 HC R&B MED SURG/OB

## 2022-06-04 PROCEDURE — 99285 EMERGENCY DEPT VISIT HI MDM: CPT | Mod: 25

## 2022-06-04 PROCEDURE — 85027 COMPLETE CBC AUTOMATED: CPT | Performed by: EMERGENCY MEDICINE

## 2022-06-04 PROCEDURE — 93005 ELECTROCARDIOGRAM TRACING: CPT

## 2022-06-04 PROCEDURE — 83605 ASSAY OF LACTIC ACID: CPT | Performed by: EMERGENCY MEDICINE

## 2022-06-04 PROCEDURE — 76705 ECHO EXAM OF ABDOMEN: CPT

## 2022-06-04 RX ORDER — PIPERACILLIN SODIUM, TAZOBACTAM SODIUM 2; .25 G/10ML; G/10ML
2.25 INJECTION, POWDER, LYOPHILIZED, FOR SOLUTION INTRAVENOUS EVERY 6 HOURS
Status: DISCONTINUED | OUTPATIENT
Start: 2022-06-04 | End: 2022-06-05

## 2022-06-04 RX ORDER — PIPERACILLIN SODIUM, TAZOBACTAM SODIUM 2; .25 G/10ML; G/10ML
2.25 INJECTION, POWDER, LYOPHILIZED, FOR SOLUTION INTRAVENOUS ONCE
Status: COMPLETED | OUTPATIENT
Start: 2022-06-04 | End: 2022-06-04

## 2022-06-04 RX ORDER — NALOXONE HYDROCHLORIDE 0.4 MG/ML
0.2 INJECTION, SOLUTION INTRAMUSCULAR; INTRAVENOUS; SUBCUTANEOUS
Status: DISCONTINUED | OUTPATIENT
Start: 2022-06-04 | End: 2022-06-11 | Stop reason: HOSPADM

## 2022-06-04 RX ORDER — ONDANSETRON 2 MG/ML
4 INJECTION INTRAMUSCULAR; INTRAVENOUS EVERY 6 HOURS PRN
Status: DISCONTINUED | OUTPATIENT
Start: 2022-06-04 | End: 2022-06-05

## 2022-06-04 RX ORDER — LIDOCAINE 40 MG/G
CREAM TOPICAL
Status: DISCONTINUED | OUTPATIENT
Start: 2022-06-04 | End: 2022-06-11 | Stop reason: HOSPADM

## 2022-06-04 RX ORDER — ONDANSETRON 4 MG/1
4 TABLET, ORALLY DISINTEGRATING ORAL EVERY 6 HOURS PRN
Status: DISCONTINUED | OUTPATIENT
Start: 2022-06-04 | End: 2022-06-05

## 2022-06-04 RX ORDER — PROCHLORPERAZINE MALEATE 5 MG
5 TABLET ORAL EVERY 6 HOURS PRN
Status: DISCONTINUED | OUTPATIENT
Start: 2022-06-04 | End: 2022-06-11 | Stop reason: HOSPADM

## 2022-06-04 RX ORDER — ACETAMINOPHEN 650 MG/1
650 SUPPOSITORY RECTAL EVERY 6 HOURS PRN
Status: DISCONTINUED | OUTPATIENT
Start: 2022-06-04 | End: 2022-06-11 | Stop reason: HOSPADM

## 2022-06-04 RX ORDER — SODIUM CHLORIDE 9 MG/ML
INJECTION, SOLUTION INTRAVENOUS CONTINUOUS
Status: DISCONTINUED | OUTPATIENT
Start: 2022-06-04 | End: 2022-06-04

## 2022-06-04 RX ORDER — HYDROMORPHONE HCL IN WATER/PF 6 MG/30 ML
0.2 PATIENT CONTROLLED ANALGESIA SYRINGE INTRAVENOUS
Status: DISCONTINUED | OUTPATIENT
Start: 2022-06-04 | End: 2022-06-11 | Stop reason: HOSPADM

## 2022-06-04 RX ORDER — PROCHLORPERAZINE 25 MG
12.5 SUPPOSITORY, RECTAL RECTAL EVERY 12 HOURS PRN
Status: DISCONTINUED | OUTPATIENT
Start: 2022-06-04 | End: 2022-06-11 | Stop reason: HOSPADM

## 2022-06-04 RX ORDER — NALOXONE HYDROCHLORIDE 0.4 MG/ML
0.4 INJECTION, SOLUTION INTRAMUSCULAR; INTRAVENOUS; SUBCUTANEOUS
Status: DISCONTINUED | OUTPATIENT
Start: 2022-06-04 | End: 2022-06-11 | Stop reason: HOSPADM

## 2022-06-04 RX ORDER — IBUPROFEN 200 MG
600 TABLET ORAL EVERY 6 HOURS PRN
COMMUNITY
End: 2022-07-11

## 2022-06-04 RX ORDER — POTASSIUM CHLORIDE 1500 MG/1
20 TABLET, EXTENDED RELEASE ORAL ONCE
Status: COMPLETED | OUTPATIENT
Start: 2022-06-05 | End: 2022-06-05

## 2022-06-04 RX ORDER — ACETAMINOPHEN 325 MG/1
650 TABLET ORAL EVERY 6 HOURS PRN
Status: DISCONTINUED | OUTPATIENT
Start: 2022-06-04 | End: 2022-06-11 | Stop reason: HOSPADM

## 2022-06-04 RX ORDER — POTASSIUM CHLORIDE 7.45 MG/ML
10 INJECTION INTRAVENOUS
Status: DISPENSED | OUTPATIENT
Start: 2022-06-04 | End: 2022-06-04

## 2022-06-04 RX ADMIN — SODIUM CHLORIDE 1000 ML: 9 INJECTION, SOLUTION INTRAVENOUS at 12:53

## 2022-06-04 RX ADMIN — SODIUM CHLORIDE 1000 ML: 9 INJECTION, SOLUTION INTRAVENOUS at 15:00

## 2022-06-04 RX ADMIN — PIPERACILLIN SODIUM AND TAZOBACTAM SODIUM 2.25 G: 2; .25 INJECTION, POWDER, LYOPHILIZED, FOR SOLUTION INTRAVENOUS at 22:39

## 2022-06-04 RX ADMIN — ACETAMINOPHEN 650 MG: 325 TABLET ORAL at 17:41

## 2022-06-04 RX ADMIN — PIPERACILLIN SODIUM AND TAZOBACTAM SODIUM 2.25 G: 2; .25 INJECTION, POWDER, LYOPHILIZED, FOR SOLUTION INTRAVENOUS at 14:27

## 2022-06-04 RX ADMIN — SODIUM BICARBONATE 100 ML/HR: 84 INJECTION, SOLUTION INTRAVENOUS at 22:32

## 2022-06-04 RX ADMIN — POTASSIUM CHLORIDE 10 MEQ: 7.46 INJECTION, SOLUTION INTRAVENOUS at 19:21

## 2022-06-04 ASSESSMENT — ACTIVITIES OF DAILY LIVING (ADL)
TOILETING_ISSUES: NO
CHANGE_IN_FUNCTIONAL_STATUS_SINCE_ONSET_OF_CURRENT_ILLNESS/INJURY: NO
FALL_HISTORY_WITHIN_LAST_SIX_MONTHS: YES
CONCENTRATING,_REMEMBERING_OR_MAKING_DECISIONS_DIFFICULTY: NO
ADLS_ACUITY_SCORE: 35
DRESSING/BATHING_DIFFICULTY: NO
ADLS_ACUITY_SCORE: 35
WALKING_OR_CLIMBING_STAIRS_DIFFICULTY: NO
WEAR_GLASSES_OR_BLIND: NO
ADLS_ACUITY_SCORE: 18
DOING_ERRANDS_INDEPENDENTLY_DIFFICULTY: NO
ADLS_ACUITY_SCORE: 18
NUMBER_OF_TIMES_PATIENT_HAS_FALLEN_WITHIN_LAST_SIX_MONTHS: 1
DIFFICULTY_EATING/SWALLOWING: NO

## 2022-06-04 ASSESSMENT — ENCOUNTER SYMPTOMS
HEADACHES: 0
DYSURIA: 0
COUGH: 0
FEVER: 0
VOMITING: 0
FREQUENCY: 0
SHORTNESS OF BREATH: 1
DIARRHEA: 1
NAUSEA: 0

## 2022-06-04 NOTE — PROGRESS NOTES
RECEIVING UNIT ED HANDOFF REVIEW    ED Nurse Handoff Report was reviewed by: Ruiz Wilburn RN on June 4, 2022 at 5:20 PM

## 2022-06-04 NOTE — H&P
Admitted: 06/04/2022    CHIEF COMPLAINT:  Diarrhea, abdominal discomfort and feeling unwell.    HISTORY OF PRESENT ILLNESS:  Ms. Hicks is a 77-year-old female with a past medical history significant for chronic kidney disease, hypokalemia, cervical cancer, COPD, who presents to the Emergency Department with the above concerns.  History is obtained through discussion with the ED physician, the patient and chart review.  The patient states that she has been feeling unwell for approximately 3 days, which she describes as a generalized tiredness, poor p.o. intake, lack of appetite associated with some abdominal discomfort and some looser than normal stools.  The patient states that there were no preceding symptoms and she felt normal up until this point 3 days ago.  She presented to the Emergency Department at [facility] 2 days ago with these symptoms and was noted to have a mildly elevated creatinine above her baseline, as well as a white count of 20, but no other specific symptoms.  She was discharged to home but has not improved.  She states that she is taking little p.o. at this point, though when she does take a little bit of fluid or something to eat, she is not having any specific nausea or throwing up.  She describes having 3-4 looser stools per day but it is not jody watery diarrhea.  There is no blood in the stool, nor any melena.  She does not have any nausea or emesis.  No fevers or chills that she is aware.  No recent sick contacts or traveling in the past couple of weeks, though she did travel to the [facility] part of the [facility] about a month and a half ago.  No one around her has similar symptoms.  She has not had any recent antibiotics.  She is not prone to having GI infections and does not follow with a PCP regularly as she has very few medical issues that she takes any medications for.    In the Emergency Department, the patient was noted to have a white count of 31, up from 20 a couple of  days ago.  Creatinine has climbed a bit further to 2.79 from 2.47, and potassium is slightly low.  Also noted to have an acidosis.  Abdominal CT showed evidence of pancolitis.  Abdominal ultrasound is currently pending.  The patient has gotten some IV fluids and feels about the same as she has previously.  Denies any chest pain, new shortness of breath or cough.  She does have COPD and is a chronic smoker, so has some shortness of breath at baseline.    PAST MEDICAL HISTORY:    1.  Chronic kidney disease stage III to IV with a baseline creatinine of roughly of 2.1-2.3.  2.  Chronic hypokalemia, not currently on any replacements.  3.  Kidney stones:  The patient has had multiple bilateral kidney stones and has had hydronephrosis in the past, but on most recent CT in 01/2021, had bilateral stones.  4.  Cervical cancer in 1978.  5.  Alcohol use, though sober for approximately 30 years.  6.  COPD.  7.  Tobacco dependence.  8.  Dyslipidemia.  9.  Chronic venous insufficiency.    MEDICATIONS:    Medications Prior to Admission   Medication Sig Dispense Refill Last Dose     ibuprofen (ADVIL/MOTRIN) 200 MG tablet Take 600 mg by mouth every 6 hours as needed for mild pain   prn          SOCIAL HISTORY:  The patient smokes approximately 1/2-pack of cigarettes per day and has been smoking since her late teens.  She does live alone and denies any use of alcohol.    FAMILY HISTORY:  Father had cancer and hypertension, and multiple family members had kidney stones and kidney issues.    ALLERGIES:  NO KNOWN DRUG ALLERGIES.    REVIEW OF SYSTEMS:  A complete review of systems reviewed and negative, save for the pertinent positives, which are recorded in the HPI.    PHYSICAL EXAM:   VITAL SIGNS:  Blood pressure of 121/57, heart rate 93, respirations 29, satting 100% on room air, temperature 98.5 degrees Fahrenheit.   GENERAL:  The patient is sitting in bed and appears comfortable.  HEENT:  Pupils equal, round, reactive to light.   Extraocular muscle function intact.  No scleral icterus.  Oropharynx is clear.   NECK:  No lymphadenopathy or thyromegaly.  CARDIOVASCULAR:  Heart is regular rate and rhythm without any murmur, rub or gallop.  PULMONARY:  Lungs are clear to auscultation bilaterally without wheeze or rhonchi.  GASTROINTESTINAL:  Positive bowel sounds.  Soft with no distention.  She is tender diffusely, but a bit worse in the right lower quadrant.  SKIN:  She has scattered bruises on the legs, which are chronic, and no new skin lesions.  LYMPHATICS:  No peripheral edema.  PSYCHIATRIC:  Alert and oriented x 3, normal affect.  NEUROLOGIC:  Cranial nerves II through XII are grossly intact without any focal deficits.    LABORATORY DATA:  WBC count 31.6, hemoglobin 12.1, platelets of 324.  Sodium 142, potassium 3.3, chloride 116, CO2 11, BUN 40, creatinine 2.79, calcium of 10.2, anion gap of 15, total protein 6.2, albumin 2.5.  Lipase is 63.  LFTs are unremarkable.  Blood cultures are pending.     IMAGING:  Abdominal CT shows moderate diffuse wall thickening consistent with pancolitis.  The gallbladder is distended and there are multiple nonobstructing stones in both kidneys, large on the left measuring 0.9 cm.  There is an indeterminate 0.4 cm left upper lobe pulmonary nodule.      Ultrasound shows that the gallbladder is distended, measuring 11 mm in length and 4.5 cm in diameter, but no wall thickening or cholelithiasis.  There is a positive sonographic Garcia sign.    IMPRESSION AND PLAN: Ms. Hicks is a 77-year-old female with a past medical history significant for chronic kidney disease, bilateral kidney stones, chronic obstructive pulmonary disease, who presents to the Emergency Department with 3 days of abdominal discomfort and loose stools, found to have pancolitis and possible cholecystitis.  1.  Abdominal discomfort with diarrhea:  The patient does have pancolitis on CT and an ultrasound that shows distention, as well as a  positive sonographic Garcia sign, but no evidence of wall thickening, cholelithiasis, or any mention of pericholecystic fluid.  For the pancolitis, there is no blood or suggestion of ischemia necessarily at this time.  With a white count of 30, which has increased substantially over the past couple of days, concerned about a possible infectious etiology.  We will obtain a C. diff and stool cultures when she produces a sample, but will continue on Zosyn for now.  Of note, she does not have jody watery diarrhea, nor any recent antibiotics or exposures that would make me think this is more likely C. diff at this point.  2.  Gallbladder distention and positive sonographic Garcia sign:  The patient does not have any biliary colic leading up to this, no stones, but does have some features of possible cholecystitis, especially given her abdominal discomfort and the white count.  I will have her n.p.o. for now and continue with Zosyn as discussed above.  We will get general surgery consultation to decide if they need to investigate further and potentially get a HIDA scan.  3.  Acute kidney injury on chronic kidney disease stage III:  Baseline creatinine is roughly 2.1-2.3 and has gone up from 2.47-2.7 over the past couple of days, likely due to poor p.o. intake and loose stools.  She has an associated metabolic acidosis, which is worse than her baseline.  I am going to hydrate with half-normal saline and bicarbonate, and she has already received a normal saline bolus in the ED.  We will repeat labs this evening and tomorrow morning.  4.  Mild hypokalemia:  This appears to be more of a chronic issue.  We will give her 20 mEq of potassium and see how she responds to this and the IV fluids.  5.  Chronic obstructive pulmonary disease with history of tobacco dependence:  Patient is not currently on any medications for this and is satting appropriately.  6.  A 4 mm left upper lobe pulmonary nodule:  This was not noted on CT in  2021.  Recommendation is for consideration of a 12-month follow up CT, but will ask the patient to follow up with her PCP to discuss further.  7.  Bilateral kidney stones:  No evidence of obstruction.  These appear chronic in nature and are likely not contributing to the current presentation.  8.  Mild hypercalcemia:  The patient will be hydrated with normal saline and repeat a BMP.  9.  Disposition:  The patient was brought under inpatient status, as I expect at least 2 nights in the hospital for treatment of the colitis and improvement in her renal function and electrolytes.    Dimitry Crabtree DO        D: 2022   T: 2022   MT: ROME    Name:     SERGIO BENTLEY  MRN:      [id number]        Account:     [id number]   :      1945           Admitted:    2022       Document: [id number]

## 2022-06-04 NOTE — ED TRIAGE NOTES
Pt here by EMS from home. She lives alone. Has been feeling increasingly weak since Thursday. She reports that she fell down last night. Having right rib pain now. Painful to take a deep breath. Pt states having worsening exertional dyspnea. Pt A&Ox4     Triage Assessment     Row Name 06/04/22 1135       Triage Assessment (Adult)    Airway WDL WDL       Respiratory WDL    Respiratory WDL X  exertional dyspnea       Skin Circulation/Temperature WDL    Skin Circulation/Temperature WDL WDL       Cardiac WDL    Cardiac WDL WDL       Peripheral/Neurovascular WDL    Peripheral Neurovascular WDL WDL       Cognitive/Neuro/Behavioral WDL    Cognitive/Neuro/Behavioral WDL WDL

## 2022-06-04 NOTE — PHARMACY-ADMISSION MEDICATION HISTORY
Pharmacy Medication History  Admission medication history interview status for the 6/4/2022  admission is complete. See EPIC admission navigator for prior to admission medications     Location of Interview: Patient room  Medication history sources: Patient    Significant changes made to the medication list:  Removed percocet  Added ibuprofen, has not taken any today     In the past week, patient estimated taking medication this percent of the time: n/a    Additional medication history information:   none    Medication reconciliation completed by provider prior to medication history? No    Time spent in this activity: 5 mins    Prior to Admission medications    Medication Sig Last Dose Taking? Auth Provider   ibuprofen (ADVIL/MOTRIN) 200 MG tablet Take 600 mg by mouth every 6 hours as needed for mild pain prn Yes Unknown, Entered By History     Julia Glaser, PharmD     The information provided in this note is only as accurate as the sources available at the time of update(s)

## 2022-06-04 NOTE — ED NOTES
[facility]  ED Nurse Handoff Report    ED Chief complaint: Generalized Weakness      ED Diagnosis:   Final diagnoses:   Pancolitis (H)   Chronic renal impairment, unspecified CKD stage       Code Status: not discussed by ED RN     Allergies: No Known Allergies    Patient Story: 77F increasing weakness  Focused Assessment:  Pt here by EMS from home. She is having increasing weakness since Thursday. She fell last night, injuring her right rib area. Pt states pain with deep breath. Pt unable to sit up and get out of bed on her own, normally independent. Pt having diarrhea. Hx smoking, pt having worsening exertional dyspnea. See CT results. US results pending  Labs Ordered and Resulted from Time of ED Arrival to Time of ED Departure   COMPREHENSIVE METABOLIC PANEL - Abnormal       Result Value    Sodium 142      Potassium 3.3 (*)     Chloride 116 (*)     Carbon Dioxide (CO2) 11 (*)     Anion Gap 15 (*)     Urea Nitrogen 40 (*)     Creatinine 2.79 (*)     Calcium 10.2 (*)     Glucose 94      Alkaline Phosphatase 98      AST 19      ALT 9      Protein Total 6.2 (*)     Albumin 2.5 (*)     Bilirubin Total 0.2      GFR Estimate 17 (*)    LIPASE - Abnormal    Lipase 63 (*)    CBC WITH PLATELETS AND DIFFERENTIAL - Abnormal    WBC Count 31.6 (*)     RBC Count 3.92      Hemoglobin 12.1      Hematocrit 37.5      MCV 96      MCH 30.9      MCHC 32.3      RDW 17.0 (*)     Platelet Count 324     DIFFERENTIAL - Abnormal    % Neutrophils 85      % Lymphocytes 2      % Monocytes 9      % Eosinophils 1      % Basophils 0      % Metamyelocytes 1      % Myelocytes 1      % Promyelocytes 1      Absolute Neutrophils 26.9 (*)     Absolute Lymphocytes 0.6 (*)     Absolute Monocytes 2.8 (*)     Absolute Eosinophils 0.3      Absolute Basophils 0.0      Absolute Metamyelocytes 0.3 (*)     Absolute Myelocytes 0.3 (*)     Absolute Promyelocytes 0.3 (*)     RBC Morphology Confirmed RBC Indices      Platelet Assessment        Value:  Automated Count Confirmed. Platelet morphology is normal.    Acanthocytes Moderate (*)     Polychromasia Slight (*)    LACTIC ACID WHOLE BLOOD - Normal    Lactic Acid 1.5     ROUTINE UA WITH MICROSCOPIC REFLEX TO CULTURE   BLOOD CULTURE   BLOOD CULTURE   ENTERIC BACTERIA AND VIRUS PANEL BY DEMETRIUS STOOL   CLOSTRIDIUM DIFFICILE TOXIN B         Treatments and/or interventions provided: fluids, zosyn  Patient's response to treatments and/or interventions: no change    To be done/followed up on inpatient unit:  urine sample, stool sample    Does this patient have any cognitive concerns?: N/A    Activity level - Baseline/Home:  Independent  Activity Level - Current:   Stand with Assist    Patient's Preferred language: English   Needed?: No    Isolation: Enteric  Infection: C-Diff Pending  Patient tested for COVID 19 prior to admission: YES  Bariatric?: No    Vital Signs:   Vitals:    06/04/22 1200 06/04/22 1230 06/04/22 1300 06/04/22 1430   BP:    121/57   Pulse: 93 95 95 93   Resp: 23 26 23 29   Temp:       TempSrc:       SpO2:       Weight:       Height:           Cardiac Rhythm:     Was the PSS-3 completed:   Yes  What interventions are required if any?               Family Comments:   OBS brochure/video discussed/provided to patient/family: N/A              Name of person given brochure if not patient:               Relationship to patient:     For the majority of the shift this patient's behavior was Green.   Behavioral interventions performed were .    ED NURSE PHONE NUMBER: [phone]

## 2022-06-04 NOTE — ED NOTES
Bed: ED28  Expected date: 6/4/22  Expected time: 11:04 AM  Means of arrival: Ambulance  Comments:  516 73f lethargic ETA 1109

## 2022-06-04 NOTE — H&P
[facility]    History and Physical - Hospitalist Service       Date of Admission:  6/4/2022  Dictation #: [id number]  Brief Summary (see dictation for more details): 77F hx CKD, hypokalemia, COPD presents with 3 days loose stools, abd discomfort, general malaise noted to have pancolitis, TADEO on CKD and WBC (20->30).  Abd US shows + sonographic nixon's sign and GB distension but no stone or wall thickening. Continue zosyn, Gen surg consult, NPO, IVF, repeat labs.     Clinically Significant Risk Factors Present on Admission          # Hypercalcemia: Ca = 10.2 mg/dL (Ref range: 8.5 - 10.1 mg/dL) and/or iCa = N/A on admission, will monitor as appropriate   # Anion Gap Metabolic Acidosis: AG = 15 mmol/L (Ref range: 3 - 14 mmol/L) on admission, will monitor and treat as appropriate  # Hypoalbuminemia: Albumin = 2.5 g/dL (Ref range: 3.4 - 5.0 g/dL) on admission, will monitor as appropriate            Dimitry Crabtree, DO  Hospitalist Service  [facility]  Securely message with the Vocera Web Console (learn more here)  Text page via Crossbar Paging/Directory

## 2022-06-04 NOTE — ED PROVIDER NOTES
History   Chief Complaint:  Generalized Weakness       HPI   Veda Hicks is a 77 year old female with a history of cancer, CKD, and hyperlipidemia who presents with generalized weakness that began 3 days ago. She was seen at [facility] and had a high white blood count and a slightly low potassium level. Since being seen at [facility], her weakness has not improved. Last night, she fell on her right side. She denies hitting her head or losing consciousness. Per EMS, the patient stated it was painful to take deep breaths. The patient states she is experiencing shortness of breath. She has also had loose diarrhea for 4-5 days and some abdominal discomfort.  She denies that she is experiencing nausea, vomiting, fever, cough, chest pain dysuria, abnormal urinary symptoms, or headache. The last time she urinated was a few hours prior. She denies taking any medications. She denies drinking but she does smoke.    Review of Systems   Constitutional: Negative for fever.   Respiratory: Positive for shortness of breath. Negative for cough.    Cardiovascular: Negative for chest pain.   Gastrointestinal: Positive for diarrhea. Negative for nausea and vomiting.   Genitourinary: Negative for dysuria, frequency and urgency.   Neurological: Negative for headaches.   All other systems reviewed and are negative.    Allergies:  No known allergies    Medications:  Hydrochlorothiazide  Ergocalciferol  Atorvastatin  Cholecalciferol  Omeprazole  Aspirin 81 mg  Albuterol inhaler  Methocarbamol  Oxycodone    Past Medical History:     CKD  Alcohol dependence  Anemia  Metabolic acidosis  Sepsis  Hypokalemia  Calculus of ureter  Renal colic  Enterocolitis  Hyperlipidemia  Nephrolithiasis  SIRS  Pseudomembranous colitis  OPD  Cancer  Pyuria    Past Surgical History:    Ureteroscopy  Lithotripsy  Open kidney surgery  Nephrolithotomy  Hysterectomy  Nephrectomy x2  Breast augmentation  EGD  Kidney stone surgery  Ureteroscopy  Cystoscopy,  "lithoscopy, combined    Family History:    Father: cancer, hypertension   Sister: cancer  Brother: cancer    Social History:  The patient is a smoker. The patient arrived to the ED via EMS services.    Physical Exam     Patient Vitals for the past 24 hrs:   BP Temp Temp src Pulse Resp SpO2 Height Weight   06/04/22 1430 121/57 -- -- 93 29 -- -- --   06/04/22 1300 -- -- -- 95 23 -- -- --   06/04/22 1230 -- -- -- 95 26 -- -- --   06/04/22 1200 -- -- -- 93 23 -- -- --   06/04/22 1144 -- 98.5  F (36.9  C) Temporal -- -- -- -- --   06/04/22 1139 -- -- -- -- -- -- 1.702 m (5' 7\") 45.4 kg (100 lb)   06/04/22 1134 131/71 -- -- 98 16 100 % -- --       Physical Exam  Nursing note and vitals reviewed.  Constitutional:  Oriented to person, place, and time. Cooperative.  Cachectic.  HENT:   Nose:    Nose normal.   Mouth/Throat:   Mucous membranes are normal.   Eyes:    Conjunctivae normal and EOM are normal.      Pupils are equal, round, and reactive to light.   Neck:    Trachea normal.   Cardiovascular:  Normal rate, regular rhythm, normal heart sounds and normal pulses. No murmur heard.  Pulmonary/Chest:  Very diminished breath sounds throughout but normal effort.   Abdominal:   Soft. Normal appearance and bowel sounds are normal.      There is diffuse tenderness to palpation.      There is no rebound and no CVA tenderness.   Musculoskeletal:  Extremities atraumatic x 4.   Lymphadenopathy:  No cervical adenopathy.   Neurological:   Alert and oriented to person, place, and time. Normal strength.      No cranial nerve deficit or sensory deficit. GCS eye subscore is 4. GCS verbal subscore is 5. GCS motor subscore is 6.   Skin:    Small abrasion to the back. No rash noted.   Psychiatric:   Normal mood and affect.      Emergency Department Course   ECG 1  ECG taken at 12/08/2016, ECG read at 1205  Sinus rhythm  Indeterminate frontal QRS axis  rSr pattern in V1 or V2  Probable normal variant  Q in inferior + anterior leads   Rate 93 " bpm. TN interval 643 ms. QRS duration 130 ms. QT/QTc 102/453 ms. P-R-T axes 81 0 76.     ECG 2  ECG taken at 1159, ECG read at 1205  Normal sinus rhythm  Possible Left atrial enlargement  Incomplete right bundle branch block  Cannot rule out Inferior infarct, age undetermined   Similar as compared to prior, dated 12/08/2016.  Rate 93 bpm. TN interval 154 ms. QRS duration 108 ms. QT/QTc 404/502 ms. P-R-T axes 76 78 75.     Imaging:  CT Chest Abdomen Pelvis w/o Contrast   Preliminary Result   IMPRESSION:   1.  Moderate diffuse bowel wall thickening throughout the colon is consistent with a nonspecific pancolitis.   2.  Emphysema.   3.  Multiple nonobstructing stones in both kidneys, with the largest on the left measuring 0.9 cm.   4.  The gallbladder is distended. If there is clinical suspicion for cholecystitis, gallbladder ultrasound should be considered.   5.  Indeterminate 0.4 cm left upper lobe pulmonary nodule. Please refer to pulmonary nodule follow-up guidelines below.   6.  Subcentimeter hyperdense lesions are noted in both kidneys. These are indeterminate on this noncontrast scan, but most likely represent hemorrhagic or proteinaceous renal cysts.      REFERENCE:   Guidelines for Management of Incidental Pulmonary Nodules Detected on CT Images: From the Fleischner Society 2017.    Guidelines apply to incidental nodules in patients who are 35 years or older.   Guidelines do not apply to lung cancer screening, patients with immunosuppression, or patients with known primary cancer.      SINGLE NODULE   Nodule size <6 mm   Low-risk patients: No follow-up needed.   High-risk patients: Optional follow-up at 12 months.      Nodule size 6-8 mm   Low-risk patients: Follow-up CT at 6-12 months, then consider CT at 18-24 months.   High-risk patients: Follow-up CT at 6-12 months, then at 18-24 months if no change.      Nodule size >8 mm   Either low or high-risk patients:   Consider CT, PET/CT, or tissue sampling at 3  months.      Consider referral to lung nodule clinic.         US Abdomen Limited (RUQ)    (Results Pending)     Report per radiology    Laboratory:  Labs Ordered and Resulted from Time of ED Arrival to Time of ED Departure   COMPREHENSIVE METABOLIC PANEL - Abnormal       Result Value    Sodium 142      Potassium 3.3 (*)     Chloride 116 (*)     Carbon Dioxide (CO2) 11 (*)     Anion Gap 15 (*)     Urea Nitrogen 40 (*)     Creatinine 2.79 (*)     Calcium 10.2 (*)     Glucose 94      Alkaline Phosphatase 98      AST 19      ALT 9      Protein Total 6.2 (*)     Albumin 2.5 (*)     Bilirubin Total 0.2      GFR Estimate 17 (*)    LIPASE - Abnormal    Lipase 63 (*)    CBC WITH PLATELETS AND DIFFERENTIAL - Abnormal    WBC Count 31.6 (*)     RBC Count 3.92      Hemoglobin 12.1      Hematocrit 37.5      MCV 96      MCH 30.9      MCHC 32.3      RDW 17.0 (*)     Platelet Count 324     DIFFERENTIAL - Abnormal    % Neutrophils 85      % Lymphocytes 2      % Monocytes 9      % Eosinophils 1      % Basophils 0      % Metamyelocytes 1      % Myelocytes 1      % Promyelocytes 1      Absolute Neutrophils 26.9 (*)     Absolute Lymphocytes 0.6 (*)     Absolute Monocytes 2.8 (*)     Absolute Eosinophils 0.3      Absolute Basophils 0.0      Absolute Metamyelocytes 0.3 (*)     Absolute Myelocytes 0.3 (*)     Absolute Promyelocytes 0.3 (*)     RBC Morphology Confirmed RBC Indices      Platelet Assessment        Value: Automated Count Confirmed. Platelet morphology is normal.    Acanthocytes Moderate (*)     Polychromasia Slight (*)    LACTIC ACID WHOLE BLOOD - Normal    Lactic Acid 1.5     ROUTINE UA WITH MICROSCOPIC REFLEX TO CULTURE   BLOOD CULTURE   BLOOD CULTURE          Emergency Department Course:    Reviewed:  I reviewed nursing notes, vitals, past medical history and Care Everywhere    Assessments:  1133 I obtained history and examined the patient as noted above.   1239 I rechecked the patient and explained findings.     Consults:    2057 I spoke with Dr. Crabtree of the Hospitalist service, regarding patient's presentation, findings, and plan of care.       Interventions:  1253 NS, 1 L, IV  1427 Zosyn 2.25 g, IV  1500 NS, 1 L, IV    Disposition:  The patient was admitted to the hospital under the care of Dr. Crabtree.     Impression & Plan   Medical Decision Making:  This is a 77-year-old female came in by ambulance for further evaluation of generalized weakness.  I felt it was reasonable to proceed with the above work-up including blood work and CT scan, and provide her IV fluids.  I was concerned that she might have pneumonia or possibly intra-abdominal pathology such as diverticulitis, colitis, appendicitis, bowel obstruction, pancreatitis, or possibly cholecystitis.  I also was concerned about the possibility of UTI and sepsis.  Her CT scan is concerning for pancolitis and possible cholecystitis.  Her white blood cell count also came back extremely elevated.  Therefore she was provided IV Zosyn.  I then spoke with Dimitry Crabtree DO, who will be admitting her.  I also obtained an ultrasound to look at her gallbladder, and that is concerning for cholecystitis as well.    Diagnosis:    ICD-10-CM    1. Pancolitis (H)  K51.00    2. Chronic renal impairment, unspecified CKD stage  N18.9    3. Possible acute cholecystitis  K81.0        Scribe Disclosure:  I, Yonathan Garcia, am serving as a scribe at 11:31 AM on 6/4/2022 to document services personally performed by Tommy Campa MD based on my observations and the provider's statements to me.        Tommy Campa MD  06/04/22 4670

## 2022-06-05 ENCOUNTER — APPOINTMENT (OUTPATIENT)
Dept: GENERAL RADIOLOGY | Facility: CLINIC | Age: 77
DRG: 872 | End: 2022-06-05
Attending: HOSPITALIST
Payer: COMMERCIAL

## 2022-06-05 LAB
ALBUMIN UR-MCNC: 100 MG/DL
ANION GAP SERPL CALCULATED.3IONS-SCNC: 13 MMOL/L (ref 3–14)
ANION GAP SERPL CALCULATED.3IONS-SCNC: 15 MMOL/L (ref 3–14)
ANION GAP SERPL CALCULATED.3IONS-SCNC: 15 MMOL/L (ref 3–14)
APPEARANCE UR: ABNORMAL
BACTERIA #/AREA URNS HPF: ABNORMAL /HPF
BASOPHILS # BLD MANUAL: 0.4 10E3/UL (ref 0–0.2)
BASOPHILS NFR BLD MANUAL: 1 %
BILIRUB UR QL STRIP: NEGATIVE
BUN SERPL-MCNC: 38 MG/DL (ref 7–30)
BUN SERPL-MCNC: 39 MG/DL (ref 7–30)
BUN SERPL-MCNC: 40 MG/DL (ref 7–30)
BURR CELLS BLD QL SMEAR: SLIGHT
C COLI+JEJUNI+LARI FUSA STL QL NAA+PROBE: NOT DETECTED
C DIFF TOX B STL QL: POSITIVE
CALCIUM SERPL-MCNC: 8.4 MG/DL (ref 8.5–10.1)
CALCIUM SERPL-MCNC: 8.8 MG/DL (ref 8.5–10.1)
CALCIUM SERPL-MCNC: 8.8 MG/DL (ref 8.5–10.1)
CHLORIDE BLD-SCNC: 119 MMOL/L (ref 94–109)
CHLORIDE BLD-SCNC: 119 MMOL/L (ref 94–109)
CHLORIDE BLD-SCNC: 120 MMOL/L (ref 94–109)
CO2 SERPL-SCNC: 10 MMOL/L (ref 20–32)
CO2 SERPL-SCNC: 10 MMOL/L (ref 20–32)
CO2 SERPL-SCNC: 15 MMOL/L (ref 20–32)
COLOR UR AUTO: YELLOW
CREAT SERPL-MCNC: 2.64 MG/DL (ref 0.52–1.04)
CREAT SERPL-MCNC: 2.69 MG/DL (ref 0.52–1.04)
CREAT SERPL-MCNC: 2.7 MG/DL (ref 0.52–1.04)
EC STX1 GENE STL QL NAA+PROBE: NOT DETECTED
EC STX2 GENE STL QL NAA+PROBE: NOT DETECTED
EOSINOPHIL # BLD MANUAL: 0 10E3/UL (ref 0–0.7)
EOSINOPHIL NFR BLD MANUAL: 0 %
ERYTHROCYTE [DISTWIDTH] IN BLOOD BY AUTOMATED COUNT: 17 % (ref 10–15)
GFR SERPL CREATININE-BSD FRML MDRD: 18 ML/MIN/1.73M2
GLUCOSE BLD-MCNC: 116 MG/DL (ref 70–99)
GLUCOSE BLD-MCNC: 71 MG/DL (ref 70–99)
GLUCOSE BLD-MCNC: 72 MG/DL (ref 70–99)
GLUCOSE UR STRIP-MCNC: NEGATIVE MG/DL
HCT VFR BLD AUTO: 34.7 % (ref 35–47)
HGB BLD-MCNC: 11.2 G/DL (ref 11.7–15.7)
HGB UR QL STRIP: ABNORMAL
KETONES UR STRIP-MCNC: NEGATIVE MG/DL
LACTATE SERPL-SCNC: 1 MMOL/L (ref 0.7–2)
LACTATE SERPL-SCNC: 1 MMOL/L (ref 0.7–2)
LEUKOCYTE ESTERASE UR QL STRIP: ABNORMAL
LYMPHOCYTES # BLD MANUAL: 2 10E3/UL (ref 0.8–5.3)
LYMPHOCYTES NFR BLD MANUAL: 5 %
MAGNESIUM SERPL-MCNC: 2.6 MG/DL (ref 1.6–2.3)
MCH RBC QN AUTO: 30.9 PG (ref 26.5–33)
MCHC RBC AUTO-ENTMCNC: 32.3 G/DL (ref 31.5–36.5)
MCV RBC AUTO: 96 FL (ref 78–100)
METAMYELOCYTES # BLD MANUAL: 0.8 10E3/UL
METAMYELOCYTES NFR BLD MANUAL: 2 %
MONOCYTES # BLD MANUAL: 2.8 10E3/UL (ref 0–1.3)
MONOCYTES NFR BLD MANUAL: 7 %
MUCOUS THREADS #/AREA URNS LPF: PRESENT /LPF
MYELOCYTES # BLD MANUAL: 0.8 10E3/UL
MYELOCYTES NFR BLD MANUAL: 2 %
NEUTROPHILS # BLD MANUAL: 33.3 10E3/UL (ref 1.6–8.3)
NEUTROPHILS NFR BLD MANUAL: 83 %
NITRATE UR QL: NEGATIVE
NOROV GI+II ORF1-ORF2 JNC STL QL NAA+PR: NOT DETECTED
PH UR STRIP: 7.5 [PH] (ref 5–7)
PLAT MORPH BLD: ABNORMAL
PLATELET # BLD AUTO: 336 10E3/UL (ref 150–450)
POTASSIUM BLD-SCNC: 3 MMOL/L (ref 3.4–5.3)
POTASSIUM BLD-SCNC: 3 MMOL/L (ref 3.4–5.3)
POTASSIUM BLD-SCNC: 3.3 MMOL/L (ref 3.4–5.3)
POTASSIUM BLD-SCNC: 3.3 MMOL/L (ref 3.4–5.3)
POTASSIUM BLD-SCNC: 3.5 MMOL/L (ref 3.4–5.3)
RBC # BLD AUTO: 3.62 10E6/UL (ref 3.8–5.2)
RBC MORPH BLD: ABNORMAL
RBC URINE: 53 /HPF
RVA NSP5 STL QL NAA+PROBE: NOT DETECTED
SALMONELLA SP RPOD STL QL NAA+PROBE: NOT DETECTED
SHIGELLA SP+EIEC IPAH STL QL NAA+PROBE: NOT DETECTED
SODIUM SERPL-SCNC: 144 MMOL/L (ref 133–144)
SODIUM SERPL-SCNC: 145 MMOL/L (ref 133–144)
SODIUM SERPL-SCNC: 147 MMOL/L (ref 133–144)
SP GR UR STRIP: 1.01 (ref 1–1.03)
SQUAMOUS EPITHELIAL: 2 /HPF
TRANSITIONAL EPI: 2 /HPF
UROBILINOGEN UR STRIP-MCNC: NORMAL MG/DL
V CHOL+PARA RFBL+TRKH+TNAA STL QL NAA+PR: NOT DETECTED
WBC # BLD AUTO: 37.2 10E3/UL (ref 4–11)
WBC # BLD AUTO: 40.1 10E3/UL (ref 4–11)
WBC URINE: >182 /HPF
Y ENTERO RECN STL QL NAA+PROBE: NOT DETECTED

## 2022-06-05 PROCEDURE — 81001 URINALYSIS AUTO W/SCOPE: CPT | Performed by: INTERNAL MEDICINE

## 2022-06-05 PROCEDURE — 36415 COLL VENOUS BLD VENIPUNCTURE: CPT | Performed by: INTERNAL MEDICINE

## 2022-06-05 PROCEDURE — 250N000013 HC RX MED GY IP 250 OP 250 PS 637: Performed by: INTERNAL MEDICINE

## 2022-06-05 PROCEDURE — 250N000013 HC RX MED GY IP 250 OP 250 PS 637: Performed by: HOSPITALIST

## 2022-06-05 PROCEDURE — 85027 COMPLETE CBC AUTOMATED: CPT | Performed by: INTERNAL MEDICINE

## 2022-06-05 PROCEDURE — 84132 ASSAY OF SERUM POTASSIUM: CPT | Performed by: HOSPITALIST

## 2022-06-05 PROCEDURE — 87493 C DIFF AMPLIFIED PROBE: CPT | Performed by: INTERNAL MEDICINE

## 2022-06-05 PROCEDURE — 250N000011 HC RX IP 250 OP 636: Performed by: INTERNAL MEDICINE

## 2022-06-05 PROCEDURE — 80048 BASIC METABOLIC PNL TOTAL CA: CPT | Performed by: INTERNAL MEDICINE

## 2022-06-05 PROCEDURE — 87086 URINE CULTURE/COLONY COUNT: CPT | Performed by: INTERNAL MEDICINE

## 2022-06-05 PROCEDURE — 87506 IADNA-DNA/RNA PROBE TQ 6-11: CPT | Performed by: INTERNAL MEDICINE

## 2022-06-05 PROCEDURE — 85007 BL SMEAR W/DIFF WBC COUNT: CPT | Performed by: HOSPITALIST

## 2022-06-05 PROCEDURE — 83605 ASSAY OF LACTIC ACID: CPT | Performed by: HOSPITALIST

## 2022-06-05 PROCEDURE — 71046 X-RAY EXAM CHEST 2 VIEWS: CPT

## 2022-06-05 PROCEDURE — 258N000003 HC RX IP 258 OP 636: Performed by: HOSPITALIST

## 2022-06-05 PROCEDURE — 250N000011 HC RX IP 250 OP 636: Performed by: HOSPITALIST

## 2022-06-05 PROCEDURE — 36415 COLL VENOUS BLD VENIPUNCTURE: CPT | Performed by: HOSPITALIST

## 2022-06-05 PROCEDURE — 250N000009 HC RX 250: Performed by: HOSPITALIST

## 2022-06-05 PROCEDURE — 99233 SBSQ HOSP IP/OBS HIGH 50: CPT | Performed by: HOSPITALIST

## 2022-06-05 PROCEDURE — 85048 AUTOMATED LEUKOCYTE COUNT: CPT | Performed by: HOSPITALIST

## 2022-06-05 PROCEDURE — 120N000001 HC R&B MED SURG/OB

## 2022-06-05 PROCEDURE — 83735 ASSAY OF MAGNESIUM: CPT | Performed by: HOSPITALIST

## 2022-06-05 RX ORDER — METRONIDAZOLE 500 MG/100ML
500 INJECTION, SOLUTION INTRAVENOUS EVERY 8 HOURS
Status: DISCONTINUED | OUTPATIENT
Start: 2022-06-05 | End: 2022-06-11 | Stop reason: HOSPADM

## 2022-06-05 RX ORDER — POTASSIUM CHLORIDE 1500 MG/1
40 TABLET, EXTENDED RELEASE ORAL ONCE
Status: COMPLETED | OUTPATIENT
Start: 2022-06-05 | End: 2022-06-05

## 2022-06-05 RX ORDER — POTASSIUM CHLORIDE 1500 MG/1
20 TABLET, EXTENDED RELEASE ORAL ONCE
Status: COMPLETED | OUTPATIENT
Start: 2022-06-05 | End: 2022-06-05

## 2022-06-05 RX ORDER — VANCOMYCIN HYDROCHLORIDE 125 MG/1
125 CAPSULE ORAL 4 TIMES DAILY
Status: DISCONTINUED | OUTPATIENT
Start: 2022-06-05 | End: 2022-06-11 | Stop reason: HOSPADM

## 2022-06-05 RX ADMIN — POTASSIUM CHLORIDE: 2 INJECTION, SOLUTION, CONCENTRATE INTRAVENOUS at 23:33

## 2022-06-05 RX ADMIN — PIPERACILLIN SODIUM AND TAZOBACTAM SODIUM 2.25 G: 2; .25 INJECTION, POWDER, LYOPHILIZED, FOR SOLUTION INTRAVENOUS at 03:51

## 2022-06-05 RX ADMIN — VANCOMYCIN HYDROCHLORIDE 125 MG: 125 CAPSULE ORAL at 08:39

## 2022-06-05 RX ADMIN — VANCOMYCIN HYDROCHLORIDE 125 MG: 125 CAPSULE ORAL at 06:36

## 2022-06-05 RX ADMIN — POTASSIUM CHLORIDE 40 MEQ: 1500 TABLET, EXTENDED RELEASE ORAL at 06:36

## 2022-06-05 RX ADMIN — VANCOMYCIN HYDROCHLORIDE 125 MG: 125 CAPSULE ORAL at 17:59

## 2022-06-05 RX ADMIN — POTASSIUM CHLORIDE 20 MEQ: 1500 TABLET, EXTENDED RELEASE ORAL at 21:33

## 2022-06-05 RX ADMIN — POTASSIUM CHLORIDE 20 MEQ: 1500 TABLET, EXTENDED RELEASE ORAL at 00:08

## 2022-06-05 RX ADMIN — ACETAMINOPHEN 650 MG: 325 TABLET ORAL at 08:50

## 2022-06-05 RX ADMIN — VANCOMYCIN HYDROCHLORIDE 125 MG: 125 CAPSULE ORAL at 21:12

## 2022-06-05 RX ADMIN — POTASSIUM CHLORIDE: 2 INJECTION, SOLUTION, CONCENTRATE INTRAVENOUS at 11:38

## 2022-06-05 RX ADMIN — VANCOMYCIN HYDROCHLORIDE 125 MG: 125 CAPSULE ORAL at 13:33

## 2022-06-05 RX ADMIN — ONDANSETRON 4 MG: 4 TABLET, ORALLY DISINTEGRATING ORAL at 08:50

## 2022-06-05 RX ADMIN — METRONIDAZOLE 500 MG: 500 INJECTION, SOLUTION INTRAVENOUS at 17:03

## 2022-06-05 ASSESSMENT — ACTIVITIES OF DAILY LIVING (ADL)
ADLS_ACUITY_SCORE: 25

## 2022-06-05 NOTE — PROVIDER NOTIFICATION
MD Notification    Notified Person: MD    Notified Person Name:Taqueria    Notification Date/Time:6/5 1049    Notification Interaction:text    Purpose of Notification:  Critical value CO2 of 10  Orders Received:    Comments:

## 2022-06-05 NOTE — PROVIDER NOTIFICATION
MD Notification    Notified Person: MD    Notified Person Name: Dr. Tovar    Notification Date/Time: 6/5/22 0609    Notification Interaction: Text-paged    Purpose of Notification: Critical labs: CO2: 10, c.diff +. Did you want to place orders for vanco?    Orders Received: IV vanco ordered, IV zosyn discontinued.     Comments:

## 2022-06-05 NOTE — PROGRESS NOTES
Brief Hospitalist Cross cover note:    Temp: 98.7  F (37.1  C) Temp src: Oral BP: 111/54 Pulse: 89   Resp: 22 SpO2: 95 % O2 Device: None (Room air)       Paged requesting potassium replacement protocol  - ordered as requested.        Meri Tovar MD  11:34 PM

## 2022-06-05 NOTE — PROGRESS NOTES
Brief Hospitalist Cross cover note:    Temp: 98.7  F (37.1  C) Temp src: Oral BP: 126/61 Pulse: 91   Resp: 18 SpO2: 98 % O2 Device: None (Room air)       Paged that c diff is positive. Reviewed notes briefly. Pt has pan colitis. No other clear source of infection.  - start po vanc  - discontinue zosyn.  - discontinue surgery consult      Meri Tovar MD  6:19 AM

## 2022-06-05 NOTE — PROVIDER NOTIFICATION
MD Notification    Notified Person: MD    Notified Person Name: Jaimie  Notification Date/Time: 6-2-22 @ 3740  Notification Interaction: Texted that CO2 is critical at 10    Purpose of Notification: Critical lab  Orders Received: awaiting    Comments:

## 2022-06-05 NOTE — PLAN OF CARE
Summary:  Abdominal pain/Diarrhea/Pancolitis  DATE & TIME: 6/4/22-6/5/22 5477-3404  Cognitive Concerns/ Orientation : A/Ox4  BEHAVIOR & AGGRESSION TOOL COLOR: green  CIWA SCORE: N/A  ABNL VS/O2: VSS on RA  MOBILITY: Ax1 GB  PAIN MANAGMENT: Denies  DIET: NPO ex meds  BOWEL/BLADDER: Incontinent of stool x2, continent of bladder  ABNL LAB/BG: CO2 10 , K+3.2- replaced, recheck 3.0, replaced- Recheck at 1030. C.diff +.   DRAIN/DEVICES: PIV infusing NA bicarb 75ml/hr  SKIN: dry, scattered scabs, mepilex in place to skin tear on sacrum, mepilex in place on small open wound on upper back. Mepilex applied to skin tear on L elbow.   TESTS/PROCEDURES: C.diff: +, UA: +, UC pending  D/C DAY/GOALS/PLACE: pending colitis improvement  OTHER IMPORTANT INFO: Enteric precautions maintained. IV zosyn discontinued, PO vanco initiated.

## 2022-06-05 NOTE — PROGRESS NOTES
[facility]    Medicine Progress Note - Hospitalist Service    Date of Admission:  6/4/2022    Assessment & Plan      Ms. Hicks is a 77-year-old female with a past medical history significant for chronic kidney disease, bilateral kidney stones, chronic obstructive pulmonary disease, who presents to the Emergency Department with 3 days of abdominal discomfort and loose stools, found to have pancolitis and possible cholecystitis.  Stool PCR positive for clostridium difficile.    Sepsis due to clostridium difficile pan-colitis   She does not have an acute abdomen and lactic acid is normal. WBC is 40K.    Continue oral vancomycin     Add intravenous metronidazole     Monitor WBC, exam    Clear liquids     GB distension and suspected acute cholecystitis     General surgery was consulted so will await recommendations    Acute kidney injury with stage 3 chronic kidney disease   AG metabolic acidosis   Hypokalemia   Mild hypercalcemia   Suspect that her MA is at least partially chronic from renal failure.     Change intravenous fluid to 150meq bicarbonate in D5W with KCl.    Monitor electrolytes and renal function     Replace K as needed     Monitor urine output      Chronic obstructive lung disease   Left upper lobe nodule   Stable      Diet: Clear Liquid Diet    DVT Prophylaxis: Pneumatic Compression Devices  Crain Catheter: Not present  Central Lines: None  Cardiac Monitoring: None  Code Status: Full Code      Disposition Plan   Expected Discharge: 06/07/2022     Anticipated discharge location:  Awaiting care coordination huddle  Delays:          The patient's care was discussed with the Bedside Nurse and Patient.    Nasim Meng MD  Hospitalist Service  [facility]  Securely message with the Vocera Web Console (learn more here)  Text page via [employer] Paging/Directory     Clinically Significant Risk Factors Present on Admission                  ______________________________________________________________________    Interval History   Not much pain this morning. No nausea or vomiting.     Data reviewed today: I reviewed all medications, new labs and imaging results over the last 24 hours. I personally reviewed no images or EKG's today.    Physical Exam   Vital Signs: Temp: 97.4  F (36.3  C) Temp src: Oral BP: 117/64 Pulse: 98   Resp: 26 SpO2: 97 % O2 Device: None (Room air)    Weight: 100 lbs 0 oz  Constitutional: awake, alert, cooperative, no apparent distress  Respiratory: No increased work of breathing, good air exchange, clear to auscultation bilaterally, no crackles or wheezing  Cardiovascular: regular rate and rhythm, normal S1 and S2, no S3 or S4, and no murmur noted  GI: mildly distended but non tender- no RUQ tenderness. Some bowel sounds heard  Skin: no rashes and no jaundice  Neurologic: Awake, alert  Cranial nerves II-XII are grossly intact.  Motor is 5 out of 5 bilaterally.  Neuropsychiatric: General: normal, calm and normal eye contact    Data   Recent Labs   Lab 06/05/22  1017 06/05/22  0756 06/05/22  0517 06/04/22  2035 06/04/22  1151   WBC 40.1*  --  37.2*  --  31.6*   HGB  --   --  11.2*  --  12.1   MCV  --   --  96  --  96   PLT  --   --  336  --  324     --  145* 144 142   POTASSIUM 3.5 3.3* 3.0*  3.0* 3.2* 3.3*   CHLORIDE 119*  --  120* 120* 116*   CO2 10*  --  10* 10* 11*   BUN 40*  --  39* 41* 40*   CR 2.69*  --  2.70* 2.61* 2.79*   ANIONGAP 15*  --  15* 14 15*   CHANTE 8.8  --  8.8 9.1 10.2*   GLC 71  --  72 69* 94   ALBUMIN  --   --   --   --  2.5*   PROTTOTAL  --   --   --   --  6.2*   BILITOTAL  --   --   --   --  0.2   ALKPHOS  --   --   --   --  98   ALT  --   --   --   --  9   AST  --   --   --   --  19   LIPASE  --   --   --   --  63*     Recent Results (from the past 24 hour(s))   US Abdomen Limited (RUQ)    Narrative    EXAM: ULTRASOUND ABDOMEN LIMITED  LOCATION: [facility]  DATE/TIME:  06/04/2022, 3:27 PM    INDICATION: Abdominal pain, ? cholecystitis.  COMPARISON: None.  TECHNIQUE: Limited abdominal ultrasound.    FINDINGS:    GALLBLADDER: Gallbladder is distended measuring 11 mm in length and 4.5 cm in diameter. No wall thickening or cholelithiasis. Positive sonographic Garcia's sign.    BILE DUCTS: Extrahepatic biliary dilatation. The common duct measures 9 mm.    LIVER: Normal parenchyma with smooth contour. No focal mass. Simple cyst.    RIGHT KIDNEY: No hydronephrosis. Multiple simple cysts. Possible shadowing stone measuring 8 mm .    PANCREAS: The visualized portions are normal.    No ascites.      Impression    IMPRESSION:  1.  Distended gallbladder with focal tenderness over the gallbladder. No definite pathological thickening or gallstones demonstrated.  2.  Mild extrahepatic biliary dilatation of uncertain etiology.  3.  Possible 8 mm nonobstructing stone in the right kidney.       Medications     IV infusion builder WITH additives 125 mL/hr at 06/05/22 1138       sodium chloride (PF)  3 mL Intracatheter Q8H     vancomycin  125 mg Oral 4x Daily

## 2022-06-05 NOTE — PLAN OF CARE
Goal Outcome Evaluation:    Summary:  Abdominal pain/Diarrhea/Pancolitis  DATE & TIME: 1033-2990  Cognitive Concerns/ Orientation : A/Ox4  BEHAVIOR & AGGRESSION TOOL COLOR: green  CIWA SCORE: N/A  ABNL VS/O2: VSS on RA - sats 95% - denies SOB or chest pn  MOBILITY: Ax1 GB  PAIN MANAGMENT: abdominal pn 5/10 resolved   DIET: NPO  BOWEL/BLADDER: continent of B/B   ABNL LAB/BG: CO2 10 , K+3.2 (replaced - recheck in am)  DRAIN/DEVICES: PIV infusing NA bicarb 75ml/hr  SKIN: dry, scattered scabs, mepilex in place to skin tear on sacrum, mepilex in place on small open wound on upper back.  TESTS/PROCEDURES: none this shift  D/C DAY/GOALS/PLACE: pending colitis improvement  OTHER IMPORTANT INFO: C. Diff rule out

## 2022-06-05 NOTE — PLAN OF CARE
Summary:  Abdominal pain/Diarrhea/Pancolitis  DATE & TIME: 6-5-22 AM shift  Cognitive Concerns/ Orientation : A/Ox4, calm and cooperative.   BEHAVIOR & AGGRESSION TOOL COLOR: green  CIWA SCORE: N/A  ABNL VS/O2: VSS on RA. LS diminished. C/o SOB. LUNA noted. MD aware, chest xray ordered, results pending.   MOBILITY: Ax1 GB.   PAIN MANAGMENT:c/o abdominal pain, tylenol and zofran given this morning, pt reports some relief.   DIET: advanced to clears, tolerating.   BOWEL/BLADDER: Multiple loose/water BMs this shift.   ABNL LAB/BG: CO2 10 , K+3.5. Cr 2.69. WBC 40.1.   DRAIN/DEVICES: PIV infusing IVF with bicarb at 125 ml/hr.   SKIN: dry, scattered scabs, mepilex in place to skin tear on sacrum, mepilex in place on small open wound on upper back. Mepilex  to skin tear on L elbow.   TESTS/PROCEDURES: UC pending; chest xray results pending.   D/C DAY/GOALS/PLACE: pending, once abd pain resolved and pt able to tolerate diet.   OTHER IMPORTANT INFO: Enteric precautions maintained. Continues on PO vanco.

## 2022-06-05 NOTE — PROVIDER NOTIFICATION
MD Notification    Notified Person: MD    Notified Person Name: Dr. Tovar    Notification Date/Time: 6/5/22 6500    Notification Interaction: Text-paged    Purpose of Notification: UA+ for leukocytes, WBCs and bacteria in urine. Pt already on IV zosyn q6h. Did you want to place new orders?    Orders Received: No new orders.     Comments:

## 2022-06-06 LAB
ANION GAP SERPL CALCULATED.3IONS-SCNC: 8 MMOL/L (ref 3–14)
BACTERIA UR CULT: NORMAL
BASOPHILS # BLD MANUAL: 0 10E3/UL (ref 0–0.2)
BASOPHILS NFR BLD MANUAL: 0 %
BUN SERPL-MCNC: 36 MG/DL (ref 7–30)
BURR CELLS BLD QL SMEAR: SLIGHT
CALCIUM SERPL-MCNC: 8.3 MG/DL (ref 8.5–10.1)
CHLORIDE BLD-SCNC: 120 MMOL/L (ref 94–109)
CO2 SERPL-SCNC: 20 MMOL/L (ref 20–32)
CREAT SERPL-MCNC: 2.56 MG/DL (ref 0.52–1.04)
EOSINOPHIL # BLD MANUAL: 0 10E3/UL (ref 0–0.7)
EOSINOPHIL NFR BLD MANUAL: 0 %
ERYTHROCYTE [DISTWIDTH] IN BLOOD BY AUTOMATED COUNT: 17.4 % (ref 10–15)
GFR SERPL CREATININE-BSD FRML MDRD: 19 ML/MIN/1.73M2
GLUCOSE BLD-MCNC: 119 MG/DL (ref 70–99)
HCT VFR BLD AUTO: 32.2 % (ref 35–47)
HGB BLD-MCNC: 10.6 G/DL (ref 11.7–15.7)
LYMPHOCYTES # BLD MANUAL: 1.8 10E3/UL (ref 0.8–5.3)
LYMPHOCYTES NFR BLD MANUAL: 6 %
MCH RBC QN AUTO: 31.2 PG (ref 26.5–33)
MCHC RBC AUTO-ENTMCNC: 32.9 G/DL (ref 31.5–36.5)
MCV RBC AUTO: 95 FL (ref 78–100)
METAMYELOCYTES # BLD MANUAL: 0.3 10E3/UL
METAMYELOCYTES NFR BLD MANUAL: 1 %
MONOCYTES # BLD MANUAL: 0.3 10E3/UL (ref 0–1.3)
MONOCYTES NFR BLD MANUAL: 1 %
MYELOCYTES # BLD MANUAL: 0.9 10E3/UL
MYELOCYTES NFR BLD MANUAL: 3 %
NEUTROPHILS # BLD MANUAL: 26.3 10E3/UL (ref 1.6–8.3)
NEUTROPHILS NFR BLD MANUAL: 89 %
PLAT MORPH BLD: ABNORMAL
PLATELET # BLD AUTO: 352 10E3/UL (ref 150–450)
POTASSIUM BLD-SCNC: 3 MMOL/L (ref 3.4–5.3)
POTASSIUM BLD-SCNC: 3.3 MMOL/L (ref 3.4–5.3)
POTASSIUM BLD-SCNC: 3.4 MMOL/L (ref 3.4–5.3)
POTASSIUM BLD-SCNC: 3.6 MMOL/L (ref 3.4–5.3)
RBC # BLD AUTO: 3.4 10E6/UL (ref 3.8–5.2)
RBC MORPH BLD: ABNORMAL
SODIUM SERPL-SCNC: 148 MMOL/L (ref 133–144)
WBC # BLD AUTO: 29.5 10E3/UL (ref 4–11)

## 2022-06-06 PROCEDURE — 272N000451 HC KIT SHRLOCK 5FR POWER PICC DOUBLE LUMEN

## 2022-06-06 PROCEDURE — 99232 SBSQ HOSP IP/OBS MODERATE 35: CPT | Performed by: HOSPITALIST

## 2022-06-06 PROCEDURE — 84132 ASSAY OF SERUM POTASSIUM: CPT | Performed by: HOSPITALIST

## 2022-06-06 PROCEDURE — 250N000011 HC RX IP 250 OP 636: Performed by: INTERNAL MEDICINE

## 2022-06-06 PROCEDURE — 85007 BL SMEAR W/DIFF WBC COUNT: CPT | Performed by: HOSPITALIST

## 2022-06-06 PROCEDURE — 250N000013 HC RX MED GY IP 250 OP 250 PS 637: Performed by: HOSPITALIST

## 2022-06-06 PROCEDURE — 36569 INSJ PICC 5 YR+ W/O IMAGING: CPT

## 2022-06-06 PROCEDURE — 250N000011 HC RX IP 250 OP 636: Performed by: HOSPITALIST

## 2022-06-06 PROCEDURE — 120N000001 HC R&B MED SURG/OB

## 2022-06-06 PROCEDURE — 85027 COMPLETE CBC AUTOMATED: CPT | Performed by: HOSPITALIST

## 2022-06-06 PROCEDURE — 250N000009 HC RX 250: Performed by: INTERNAL MEDICINE

## 2022-06-06 PROCEDURE — 250N000009 HC RX 250: Performed by: HOSPITALIST

## 2022-06-06 PROCEDURE — 36415 COLL VENOUS BLD VENIPUNCTURE: CPT | Performed by: HOSPITALIST

## 2022-06-06 PROCEDURE — 250N000013 HC RX MED GY IP 250 OP 250 PS 637: Performed by: INTERNAL MEDICINE

## 2022-06-06 PROCEDURE — 82310 ASSAY OF CALCIUM: CPT | Performed by: HOSPITALIST

## 2022-06-06 PROCEDURE — 258N000003 HC RX IP 258 OP 636: Performed by: HOSPITALIST

## 2022-06-06 RX ORDER — DEXTROSE MONOHYDRATE 50 MG/ML
INJECTION, SOLUTION INTRAVENOUS CONTINUOUS
Status: DISCONTINUED | OUTPATIENT
Start: 2022-06-06 | End: 2022-06-09

## 2022-06-06 RX ORDER — POTASSIUM CHLORIDE 1500 MG/1
20 TABLET, EXTENDED RELEASE ORAL ONCE
Status: COMPLETED | OUTPATIENT
Start: 2022-06-06 | End: 2022-06-06

## 2022-06-06 RX ORDER — SODIUM BICARBONATE 650 MG/1
650 TABLET ORAL 4 TIMES DAILY
Status: DISCONTINUED | OUTPATIENT
Start: 2022-06-06 | End: 2022-06-11 | Stop reason: HOSPADM

## 2022-06-06 RX ORDER — HYDROMORPHONE HCL IN WATER/PF 6 MG/30 ML
0.2 PATIENT CONTROLLED ANALGESIA SYRINGE INTRAVENOUS ONCE
Status: DISCONTINUED | OUTPATIENT
Start: 2022-06-06 | End: 2022-06-07

## 2022-06-06 RX ORDER — POTASSIUM CHLORIDE 1500 MG/1
40 TABLET, EXTENDED RELEASE ORAL ONCE
Status: COMPLETED | OUTPATIENT
Start: 2022-06-06 | End: 2022-06-06

## 2022-06-06 RX ORDER — LIDOCAINE 40 MG/G
CREAM TOPICAL
Status: DISCONTINUED | OUTPATIENT
Start: 2022-06-06 | End: 2022-06-06

## 2022-06-06 RX ADMIN — METRONIDAZOLE 500 MG: 500 INJECTION, SOLUTION INTRAVENOUS at 23:43

## 2022-06-06 RX ADMIN — METRONIDAZOLE 500 MG: 500 INJECTION, SOLUTION INTRAVENOUS at 09:36

## 2022-06-06 RX ADMIN — VANCOMYCIN HYDROCHLORIDE 125 MG: 125 CAPSULE ORAL at 17:35

## 2022-06-06 RX ADMIN — POTASSIUM CHLORIDE 20 MEQ: 1500 TABLET, EXTENDED RELEASE ORAL at 11:10

## 2022-06-06 RX ADMIN — SODIUM BICARBONATE 650 MG TABLET 650 MG: at 12:32

## 2022-06-06 RX ADMIN — VANCOMYCIN HYDROCHLORIDE 125 MG: 125 CAPSULE ORAL at 09:36

## 2022-06-06 RX ADMIN — POTASSIUM CHLORIDE: 2 INJECTION, SOLUTION, CONCENTRATE INTRAVENOUS at 05:00

## 2022-06-06 RX ADMIN — POTASSIUM CHLORIDE 40 MEQ: 1500 TABLET, EXTENDED RELEASE ORAL at 17:35

## 2022-06-06 RX ADMIN — METRONIDAZOLE 500 MG: 500 INJECTION, SOLUTION INTRAVENOUS at 16:10

## 2022-06-06 RX ADMIN — DEXTROSE MONOHYDRATE: 50 INJECTION, SOLUTION INTRAVENOUS at 23:43

## 2022-06-06 RX ADMIN — HYDROMORPHONE HYDROCHLORIDE 0.2 MG: 0.2 INJECTION, SOLUTION INTRAMUSCULAR; INTRAVENOUS; SUBCUTANEOUS at 22:56

## 2022-06-06 RX ADMIN — VANCOMYCIN HYDROCHLORIDE 125 MG: 125 CAPSULE ORAL at 12:32

## 2022-06-06 RX ADMIN — METRONIDAZOLE 500 MG: 500 INJECTION, SOLUTION INTRAVENOUS at 02:28

## 2022-06-06 RX ADMIN — DEXTROSE MONOHYDRATE: 50 INJECTION, SOLUTION INTRAVENOUS at 12:31

## 2022-06-06 RX ADMIN — SODIUM BICARBONATE 650 MG TABLET 650 MG: at 17:35

## 2022-06-06 RX ADMIN — LIDOCAINE HYDROCHLORIDE 1 ML: 10 INJECTION, SOLUTION EPIDURAL; INFILTRATION; INTRACAUDAL; PERINEURAL at 08:39

## 2022-06-06 RX ADMIN — POTASSIUM CHLORIDE 20 MEQ: 1500 TABLET, EXTENDED RELEASE ORAL at 23:43

## 2022-06-06 RX ADMIN — VANCOMYCIN HYDROCHLORIDE 125 MG: 125 CAPSULE ORAL at 22:00

## 2022-06-06 RX ADMIN — ACETAMINOPHEN 650 MG: 325 TABLET ORAL at 19:21

## 2022-06-06 RX ADMIN — SODIUM BICARBONATE 650 MG TABLET 650 MG: at 22:00

## 2022-06-06 ASSESSMENT — ACTIVITIES OF DAILY LIVING (ADL)
ADLS_ACUITY_SCORE: 25
ADLS_ACUITY_SCORE: 22
ADLS_ACUITY_SCORE: 25
ADLS_ACUITY_SCORE: 26
ADLS_ACUITY_SCORE: 26
ADLS_ACUITY_SCORE: 22
ADLS_ACUITY_SCORE: 26
ADLS_ACUITY_SCORE: 22
ADLS_ACUITY_SCORE: 22
ADLS_ACUITY_SCORE: 25
ADLS_ACUITY_SCORE: 22
ADLS_ACUITY_SCORE: 22

## 2022-06-06 NOTE — PROVIDER NOTIFICATION
"Hospitalist Cross Cover  6/6/2022    Notified of patient with infiltrated IV that was infusing sodium bicarb. Chart reviewed.     Per RN, patient has bump the size of a half small plum. Applying ice to area and elevating arm.     /60 (BP Location: Right arm)   Pulse 88   Temp 97.4  F (36.3  C) (Oral)   Resp 16   Ht 1.702 m (5' 7\")   Wt 45.4 kg (100 lb)   SpO2 95%   BMI 15.66 kg/m      Plan: RN discussed with pharmacy who indicates the infiltration is more irritant than vessicant and that hylanex could be used to aid absorption.    Discussed with bedside nurse.    Will allow area to resolve with ice and elevation.   Placed order for PICC to allow for safer sodium bicarb infusion in the future.     Leni Etienne MD        "

## 2022-06-06 NOTE — PLAN OF CARE
"Summary:  Abdominal pain/Diarrhea/Pancolitis  DATE & TIME: 6-6-22  AM shift  Cognitive Concerns/ Orientation : Alert and Oriented x4, calm and cooperative.   BEHAVIOR & AGGRESSION TOOL COLOR: green  CIWA SCORE: N/A  ABNL VS/O2: VSS on RA. LS diminished. Denied SOB. LUNA noted  MOBILITY: Ax1 GB. Unsteady, ambulated in the parr x 1, feels weak and unsteady. Impulsive when needing to go to the bathroom.    PAIN MANAGMENT:denied abdominal pain this shift. States \"I dont feel good\", when asked to elaborate Pt says \"I am ok\".   DIET: Clears, tolerating.   BOWEL/BLADDER: Multiple loose/watery BMs this shift.   ABNL LAB/BG: Na 148, Cr 2.56. K 3.3, replaced, recheck scheduled for 3 pm. WBC 29.5. Hg 10.6.   DRAIN/DEVICES: R PICC infusing with IVF at 100 ml/hr  SKIN: Dry fragile skin. Scattered scabs and bruises all over. Mepilex in place to skin tear on sacrum, changed this shift. Mepilex in place on small open wound on upper back and skin tear on L elbow.   TESTS/PROCEDURES: n/a  D/C DAY/GOALS/PLACE: pending, once abd pain resolved and pt able to tolerate diet.   OTHER IMPORTANT INFO: Enteric precautions maintained. Continues on PO vanco on IV Flagyl.    "

## 2022-06-06 NOTE — PLAN OF CARE
Goal Outcome Evaluation:  Summary:  Abdominal pain/Diarrhea/Pancolitis  DATE & TIME: 6-5-22 0866-7339  Cognitive Concerns/ Orientation : A/Ox4, calm and cooperative.   BEHAVIOR & AGGRESSION TOOL COLOR: green  CIWA SCORE: N/A  ABNL VS/O2: VSS on RA. LS diminished. Denied SOB. LUNA present.    MOBILITY: Ax1 GB. - weak during ambulating   PAIN MANAGMENT:denied abdominal pain/pain   DIET: advanced to clears, tolerating.   BOWEL/BLADDER: Multiple loose/water BMs this shift.   ABNL LAB/BG: CO2 10 , K+3.3 (replaced recheck at 0130). Cr 2.64. WBC 40.1.   DRAIN/DEVICES: PIV infusing IVF with bicarb at 125 ml/hr.   SKIN: dry, scattered scabs, mepilex in place to skin tear on sacrum, mepilex in place on small open wound on upper back. Mepilex  to skin tear on L elbow.   TESTS/PROCEDURES: UC pending; chest xray results pending.   D/C DAY/GOALS/PLACE: pending, once abd pain resolved and pt able to tolerate diet.   OTHER IMPORTANT INFO: Enteric precautions maintained. Continues on PO vanco.

## 2022-06-06 NOTE — PROCEDURES
[facility]    Double Lumen PICC Placement    Date/Time: 6/6/2022 8:58 AM  Performed by: Jinny Ann RN  Authorized by: Leni Etienne MD   Indications: vascular access      UNIVERSAL PROTOCOL   Site Marked: Yes  Prior Images Obtained and Reviewed:  Yes  Required items: Required blood products, implants, devices and special equipment available    Patient identity confirmed:  Verbally with patient, arm band and hospital-assigned identification number  NA - No sedation, light sedation, or local anesthesia  Confirmation Checklist:  Patient's identity using two indicators, correct equipment/implants were available, relevant allergies and procedure was appropriate and matched the consent or emergent situation  Time out: Immediately prior to the procedure a time out was called    Universal Protocol: the Joint Commission Universal Protocol was followed    Preparation: Patient was prepped and draped in usual sterile fashion    ESBL (mL):  2     ANESTHESIA    Local Anesthetic: Lidocaine 1% without epinephrine  Anesthetic Total (mL):  1      SEDATION    Patient Sedated: No        Preparation: skin prepped with ChloraPrep  Skin prep agent: skin prep agent completely dried prior to procedure  Sterile barriers: maximum sterile barriers were used: cap, mask, sterile gown, sterile gloves, and large sterile sheet  Hand hygiene: hand hygiene performed prior to central venous catheter insertion  Type of line used: Power PICC  Catheter type: double lumen  Lumen type: valved  Catheter size: 5 Fr  Brand: Bard  Placement method: ultrasound  Number of attempts: 1  Difficulty threading catheter: no  Successful placement: yes  Orientation: right    Location: basilic vein  Arm circumference: adults 10 cm  Extremity circumference: 20  Visible catheter length: 3  Total catheter length: 40  Dressing and securement: chlorhexidine disc applied, securement device and occlusive dressing applied  Post  procedure assessment: blood return through all ports  PROCEDURE   Patient Tolerance:  Patient tolerated the procedure well with no immediate complications   picc placed without difficulty/picc ok to use

## 2022-06-06 NOTE — PROVIDER NOTIFICATION
MD Notification    Notified Person: MD    Notified Person Name: Dr Etienne      Notification Date/Time: 6/6/22    Notification Interaction: Pager    Purpose of Notification: IV infiltrated with vesicants, please advise, area size of half plum.      Orders Received: Talk to pharmacy about treatment.      Comments: Noted by pharmacist that percentages of vesicants in the solution are more likely to be irritants.  Hylanex would help reabsorption, but otherwise not do much else for treatment.  Ice and elevation are best.  Area shrunk back to normal quickly and no pain noted by patient after initial treatment.

## 2022-06-06 NOTE — PROGRESS NOTES
[facility]    Medicine Progress Note - Hospitalist Service    Date of Admission:  6/4/2022    Assessment & Plan      Ms. Hicks is a 77-year-old female with a past medical history significant for chronic kidney disease, bilateral kidney stones, chronic obstructive pulmonary disease, who presents to the Emergency Department with 3 days of abdominal discomfort and loose stools, found to have pancolitis and possible cholecystitis.  Stool PCR positive for clostridium difficile.    Sepsis due to clostridium difficile pan-colitis   Abdominal exam remains benign.  WBC is down from 40K to 30K.    Continue oral vancomycin and intravenous metronidazole     Monitor WBC, exam    Continue with clear liquids today     GB distension and suspected acute cholecystitis   Currently no pain.      Will consider HIDA in a day or 2 when her clostridium difficile improves    Check LFT's     Acute kidney injury with stage 3 chronic kidney disease   AG metabolic acidosis   Hypokalemia   Mild hypernatremia   Mild hypercalcemia   Suspect that her MA is at least partially chronic from renal failure.   Creatinine trending down slightly.  Na 148 and CO2 up to 20.    Switch intravenous fluid to D5W    Oral NaHCO3    Monitor electrolytes and renal function     Replace K as needed     Monitor urine output      Chronic obstructive lung disease   Left upper lobe nodule   Stable      Diet: Clear Liquid Diet    DVT Prophylaxis: Pneumatic Compression Devices  Crain Catheter: Not present  Central Lines: PRESENT  PICC Double Lumen 06/06/22 Right Basilic-Site Assessment: WDL  Cardiac Monitoring: None  Code Status: Full Code      Disposition Plan   Expected Discharge: 06/08/2022     Anticipated discharge location:  Awaiting care coordination huddle  Delays:          The patient's care was discussed with the Bedside Nurse and Patient.    Nasim Meng MD  Hospitalist Service  [facility]  Securely  message with the [employer] Web Console (learn more here)  Text page via [employer] Paging/Directory     Clinically Significant Risk Factors Present on Admission                 ______________________________________________________________________    Interval History   No pain.  Asking to go home.     Data reviewed today: I reviewed all medications, new labs and imaging results over the last 24 hours. I personally reviewed no images or EKG's today.    Physical Exam   Vital Signs: Temp: 97.7  F (36.5  C) Temp src: Oral BP: 132/72 Pulse: 89   Resp: 18 SpO2: 96 % O2 Device: None (Room air)    Weight: 100 lbs 0 oz  Constitutional: awake, alert, cooperative, no apparent distress  Respiratory: No increased work of breathing, good air exchange, clear to auscultation bilaterally, no crackles or wheezing  Cardiovascular: regular rate and rhythm, normal S1 and S2, no S3 or S4, and no murmur noted  GI: no tenderness including no RUQ tenderness. Audible bowel sounds.   Skin: no rashes and no jaundice  Neurologic: Awake, alert  Cranial nerves II-XII are grossly intact.  Motor is 5 out of 5 bilaterally.  Neuropsychiatric: General: normal, calm and normal eye contact    Data   Recent Labs   Lab 06/06/22  0938 06/06/22  0120 06/05/22 2023 06/05/22  1017 06/05/22  0756 06/05/22  0517 06/04/22 2035 06/04/22  1151   WBC 29.5*  --   --  40.1*  --  37.2*  --  31.6*   HGB 10.6*  --   --   --   --  11.2*  --  12.1   MCV 95  --   --   --   --  96  --  96     --   --   --   --  336  --  324   *  --  147* 144  --  145*   < > 142   POTASSIUM 3.3* 3.6 3.3* 3.5   < > 3.0*  3.0*   < > 3.3*   CHLORIDE 120*  --  119* 119*  --  120*   < > 116*   CO2 20  --  15* 10*  --  10*   < > 11*   BUN 36*  --  38* 40*  --  39*   < > 40*   CR 2.56*  --  2.64* 2.69*  --  2.70*   < > 2.79*   ANIONGAP 8  --  13 15*  --  15*   < > 15*   CHANTE 8.3*  --  8.4* 8.8  --  8.8   < > 10.2*   *  --  116* 71  --  72   < > 94   ALBUMIN  --   --   --   --   --    --   --  2.5*   PROTTOTAL  --   --   --   --   --   --   --  6.2*   BILITOTAL  --   --   --   --   --   --   --  0.2   ALKPHOS  --   --   --   --   --   --   --  98   ALT  --   --   --   --   --   --   --  9   AST  --   --   --   --   --   --   --  19   LIPASE  --   --   --   --   --   --   --  63*    < > = values in this interval not displayed.     No results found for this or any previous visit (from the past 24 hour(s)).  Medications     D5W         metroNIDAZOLE  500 mg Intravenous Q8H     sodium bicarbonate  650 mg Oral 4x Daily     sodium chloride (PF)  10-40 mL Intracatheter Q8H     sodium chloride (PF)  3 mL Intracatheter Q8H     vancomycin  125 mg Oral 4x Daily

## 2022-06-06 NOTE — PLAN OF CARE
Summary:  Abdominal pain/Diarrhea/Pancolitis  DATE & TIME: 6-5-22 [id number]  Cognitive Concerns/ Orientation : Alert and Oriented x4, calm and cooperative.   BEHAVIOR & AGGRESSION TOOL COLOR: green  CIWA SCORE: N/A  ABNL VS/O2: VSS on RA. LS diminished. Denied SOB. LUNA present.    MOBILITY: Ax1 GB. - weak during ambulating, impulsive when needing to go to the bathroom.    PAIN MANAGMENT:denied abdominal pain/pain   DIET: advanced to clears, tolerating.   BOWEL/BLADDER: Multiple loose/water BMs this shift.   ABNL LAB/BG: CO2 10 , K+3.6 on recheck, Cr 2.64. WBC 40.1.   DRAIN/DEVICES: PIV infusing IVF with bicarb at 50 ml/hr, due to extravasation.  Running slower to give fluids but decrease risk of infiltration until PICC is placed per Dr. Etienne's orders.  See previous note for info on extravasation.    SKIN: dry, scattered scabs, mepilex in place to skin tear on sacrum, mepilex in place on small open wound on upper back. Mepilex  to skin tear on L elbow.   TESTS/PROCEDURES: UC pending; chest xray results pending.   D/C DAY/GOALS/PLACE: pending, once abd pain resolved and pt able to tolerate diet.   OTHER IMPORTANT INFO: Enteric precautions maintained. Continues on PO vanco on IV Flagyl.

## 2022-06-07 ENCOUNTER — APPOINTMENT (OUTPATIENT)
Dept: GENERAL RADIOLOGY | Facility: CLINIC | Age: 77
DRG: 872 | End: 2022-06-07
Attending: HOSPITALIST
Payer: COMMERCIAL

## 2022-06-07 ENCOUNTER — APPOINTMENT (OUTPATIENT)
Dept: ULTRASOUND IMAGING | Facility: CLINIC | Age: 77
DRG: 872 | End: 2022-06-07
Attending: HOSPITALIST
Payer: COMMERCIAL

## 2022-06-07 ENCOUNTER — APPOINTMENT (OUTPATIENT)
Dept: CARDIOLOGY | Facility: CLINIC | Age: 77
DRG: 872 | End: 2022-06-07
Attending: HOSPITALIST
Payer: COMMERCIAL

## 2022-06-07 LAB
ANION GAP SERPL CALCULATED.3IONS-SCNC: 6 MMOL/L (ref 3–14)
BASOPHILS # BLD MANUAL: 0 10E3/UL (ref 0–0.2)
BASOPHILS NFR BLD MANUAL: 0 %
BUN SERPL-MCNC: 27 MG/DL (ref 7–30)
BURR CELLS BLD QL SMEAR: ABNORMAL
CA-I BLD-MCNC: 4.6 MG/DL (ref 4.4–5.2)
CALCIUM SERPL-MCNC: 7 MG/DL (ref 8.5–10.1)
CHLORIDE BLD-SCNC: 118 MMOL/L (ref 94–109)
CO2 SERPL-SCNC: 19 MMOL/L (ref 20–32)
CREAT SERPL-MCNC: 1.98 MG/DL (ref 0.52–1.04)
D DIMER PPP FEU-MCNC: 4.36 UG/ML FEU (ref 0–0.5)
EOSINOPHIL # BLD MANUAL: 0.6 10E3/UL (ref 0–0.7)
EOSINOPHIL NFR BLD MANUAL: 3 %
ERYTHROCYTE [DISTWIDTH] IN BLOOD BY AUTOMATED COUNT: 17.2 % (ref 10–15)
FERRITIN SERPL-MCNC: 103 NG/ML (ref 8–252)
FOLATE SERPL-MCNC: 5.2 NG/ML
GFR SERPL CREATININE-BSD FRML MDRD: 26 ML/MIN/1.73M2
GLUCOSE BLD-MCNC: 109 MG/DL (ref 70–99)
HCT VFR BLD AUTO: 30.3 % (ref 35–47)
HGB BLD-MCNC: 9.8 G/DL (ref 11.7–15.7)
IRON SATN MFR SERPL: 46 % (ref 15–46)
IRON SERPL-MCNC: 69 UG/DL (ref 35–180)
LACTATE SERPL-SCNC: 0.7 MMOL/L (ref 0.7–2)
LVEF ECHO: NORMAL
LYMPHOCYTES # BLD MANUAL: 0.2 10E3/UL (ref 0.8–5.3)
LYMPHOCYTES NFR BLD MANUAL: 1 %
MCH RBC QN AUTO: 31 PG (ref 26.5–33)
MCHC RBC AUTO-ENTMCNC: 32.3 G/DL (ref 31.5–36.5)
MCV RBC AUTO: 96 FL (ref 78–100)
METAMYELOCYTES # BLD MANUAL: 0.8 10E3/UL
METAMYELOCYTES NFR BLD MANUAL: 4 %
MONOCYTES # BLD MANUAL: 1 10E3/UL (ref 0–1.3)
MONOCYTES NFR BLD MANUAL: 5 %
MYELOCYTES # BLD MANUAL: 0.2 10E3/UL
MYELOCYTES NFR BLD MANUAL: 1 %
NEUTROPHILS # BLD MANUAL: 17.4 10E3/UL (ref 1.6–8.3)
NEUTROPHILS NFR BLD MANUAL: 86 %
PLAT MORPH BLD: ABNORMAL
PLATELET # BLD AUTO: 299 10E3/UL (ref 150–450)
POTASSIUM BLD-SCNC: 3.4 MMOL/L (ref 3.4–5.3)
POTASSIUM BLD-SCNC: 3.8 MMOL/L (ref 3.4–5.3)
RBC # BLD AUTO: 3.16 10E6/UL (ref 3.8–5.2)
RBC MORPH BLD: ABNORMAL
SODIUM SERPL-SCNC: 143 MMOL/L (ref 133–144)
TIBC SERPL-MCNC: 151 UG/DL (ref 240–430)
VIT B12 SERPL-MCNC: 1789 PG/ML (ref 193–986)
WBC # BLD AUTO: 20.2 10E3/UL (ref 4–11)

## 2022-06-07 PROCEDURE — 85014 HEMATOCRIT: CPT | Performed by: HOSPITALIST

## 2022-06-07 PROCEDURE — 250N000011 HC RX IP 250 OP 636: Performed by: INTERNAL MEDICINE

## 2022-06-07 PROCEDURE — 71046 X-RAY EXAM CHEST 2 VIEWS: CPT

## 2022-06-07 PROCEDURE — 250N000011 HC RX IP 250 OP 636: Performed by: HOSPITALIST

## 2022-06-07 PROCEDURE — 255N000002 HC RX 255 OP 636: Performed by: HOSPITALIST

## 2022-06-07 PROCEDURE — 99233 SBSQ HOSP IP/OBS HIGH 50: CPT | Performed by: HOSPITALIST

## 2022-06-07 PROCEDURE — 82728 ASSAY OF FERRITIN: CPT | Performed by: HOSPITALIST

## 2022-06-07 PROCEDURE — 82607 VITAMIN B-12: CPT | Performed by: HOSPITALIST

## 2022-06-07 PROCEDURE — 82330 ASSAY OF CALCIUM: CPT | Performed by: HOSPITALIST

## 2022-06-07 PROCEDURE — 93306 TTE W/DOPPLER COMPLETE: CPT | Mod: 26 | Performed by: INTERNAL MEDICINE

## 2022-06-07 PROCEDURE — 93970 EXTREMITY STUDY: CPT

## 2022-06-07 PROCEDURE — 999N000190 HC STATISTIC VAT ROUNDS

## 2022-06-07 PROCEDURE — 83605 ASSAY OF LACTIC ACID: CPT | Performed by: HOSPITALIST

## 2022-06-07 PROCEDURE — 82746 ASSAY OF FOLIC ACID SERUM: CPT | Performed by: HOSPITALIST

## 2022-06-07 PROCEDURE — 250N000013 HC RX MED GY IP 250 OP 250 PS 637: Performed by: HOSPITALIST

## 2022-06-07 PROCEDURE — 250N000013 HC RX MED GY IP 250 OP 250 PS 637: Performed by: INTERNAL MEDICINE

## 2022-06-07 PROCEDURE — 83550 IRON BINDING TEST: CPT | Performed by: HOSPITALIST

## 2022-06-07 PROCEDURE — 250N000009 HC RX 250: Performed by: HOSPITALIST

## 2022-06-07 PROCEDURE — 94640 AIRWAY INHALATION TREATMENT: CPT

## 2022-06-07 PROCEDURE — 120N000001 HC R&B MED SURG/OB

## 2022-06-07 PROCEDURE — 999N000208 ECHOCARDIOGRAM COMPLETE

## 2022-06-07 PROCEDURE — 80048 BASIC METABOLIC PNL TOTAL CA: CPT | Performed by: HOSPITALIST

## 2022-06-07 PROCEDURE — 85007 BL SMEAR W/DIFF WBC COUNT: CPT | Performed by: HOSPITALIST

## 2022-06-07 PROCEDURE — 94642 AEROSOL INHALATION TREATMENT: CPT

## 2022-06-07 PROCEDURE — 84132 ASSAY OF SERUM POTASSIUM: CPT | Performed by: HOSPITALIST

## 2022-06-07 PROCEDURE — 85379 FIBRIN DEGRADATION QUANT: CPT | Performed by: HOSPITALIST

## 2022-06-07 RX ORDER — IPRATROPIUM BROMIDE AND ALBUTEROL SULFATE 2.5; .5 MG/3ML; MG/3ML
3 SOLUTION RESPIRATORY (INHALATION) ONCE
Status: COMPLETED | OUTPATIENT
Start: 2022-06-07 | End: 2022-06-07

## 2022-06-07 RX ORDER — NICOTINE 21 MG/24HR
1 PATCH, TRANSDERMAL 24 HOURS TRANSDERMAL DAILY
Status: DISCONTINUED | OUTPATIENT
Start: 2022-06-07 | End: 2022-06-11 | Stop reason: HOSPADM

## 2022-06-07 RX ORDER — POTASSIUM CHLORIDE 1500 MG/1
20 TABLET, EXTENDED RELEASE ORAL ONCE
Status: COMPLETED | OUTPATIENT
Start: 2022-06-07 | End: 2022-06-07

## 2022-06-07 RX ORDER — POTASSIUM CHLORIDE 1.5 G/1.58G
20 POWDER, FOR SOLUTION ORAL ONCE
Status: CANCELLED | OUTPATIENT
Start: 2022-06-07 | End: 2022-06-07

## 2022-06-07 RX ADMIN — Medication 1 MG: at 22:59

## 2022-06-07 RX ADMIN — IPRATROPIUM BROMIDE AND ALBUTEROL SULFATE 3 ML: .5; 3 SOLUTION RESPIRATORY (INHALATION) at 07:00

## 2022-06-07 RX ADMIN — METRONIDAZOLE 500 MG: 500 INJECTION, SOLUTION INTRAVENOUS at 16:11

## 2022-06-07 RX ADMIN — VANCOMYCIN HYDROCHLORIDE 125 MG: 125 CAPSULE ORAL at 21:46

## 2022-06-07 RX ADMIN — SODIUM BICARBONATE 650 MG TABLET 650 MG: at 21:46

## 2022-06-07 RX ADMIN — METRONIDAZOLE 500 MG: 500 INJECTION, SOLUTION INTRAVENOUS at 09:28

## 2022-06-07 RX ADMIN — HYDROMORPHONE HYDROCHLORIDE 0.2 MG: 0.2 INJECTION, SOLUTION INTRAMUSCULAR; INTRAVENOUS; SUBCUTANEOUS at 10:32

## 2022-06-07 RX ADMIN — ACETAMINOPHEN 650 MG: 325 TABLET ORAL at 14:30

## 2022-06-07 RX ADMIN — SODIUM BICARBONATE 650 MG TABLET 650 MG: at 18:15

## 2022-06-07 RX ADMIN — HYDROMORPHONE HYDROCHLORIDE 0.2 MG: 0.2 INJECTION, SOLUTION INTRAMUSCULAR; INTRAVENOUS; SUBCUTANEOUS at 05:29

## 2022-06-07 RX ADMIN — VANCOMYCIN HYDROCHLORIDE 125 MG: 125 CAPSULE ORAL at 08:15

## 2022-06-07 RX ADMIN — VANCOMYCIN HYDROCHLORIDE 125 MG: 125 CAPSULE ORAL at 14:30

## 2022-06-07 RX ADMIN — SODIUM BICARBONATE 650 MG TABLET 650 MG: at 08:15

## 2022-06-07 RX ADMIN — HUMAN ALBUMIN MICROSPHERES AND PERFLUTREN 3 ML: 10; .22 INJECTION, SOLUTION INTRAVENOUS at 13:36

## 2022-06-07 RX ADMIN — POTASSIUM CHLORIDE 20 MEQ: 1500 TABLET, EXTENDED RELEASE ORAL at 10:27

## 2022-06-07 RX ADMIN — SODIUM BICARBONATE 650 MG TABLET 650 MG: at 14:30

## 2022-06-07 RX ADMIN — HYDROMORPHONE HYDROCHLORIDE 0.2 MG: 0.2 INJECTION, SOLUTION INTRAMUSCULAR; INTRAVENOUS; SUBCUTANEOUS at 20:57

## 2022-06-07 RX ADMIN — VANCOMYCIN HYDROCHLORIDE 125 MG: 125 CAPSULE ORAL at 18:15

## 2022-06-07 RX ADMIN — NICOTINE 1 PATCH: 14 PATCH, EXTENDED RELEASE TRANSDERMAL at 14:32

## 2022-06-07 ASSESSMENT — ACTIVITIES OF DAILY LIVING (ADL)
ADLS_ACUITY_SCORE: 26
ADLS_ACUITY_SCORE: 28
ADLS_ACUITY_SCORE: 26
ADLS_ACUITY_SCORE: 28
ADLS_ACUITY_SCORE: 26
ADLS_ACUITY_SCORE: 28
ADLS_ACUITY_SCORE: 24
ADLS_ACUITY_SCORE: 28

## 2022-06-07 NOTE — PROGRESS NOTES
[facility]    Medicine Progress Note - Hospitalist Service    Date of Admission:  6/4/2022    Assessment & Plan      Ms. Hicks is a 77-year-old female with a past medical history significant for chronic kidney disease, bilateral kidney stones, chronic obstructive pulmonary disease, who presents to the Emergency Department with 3 days of abdominal discomfort and loose stools, found to have pancolitis and possible cholecystitis.  Stool PCR positive for clostridium difficile.    Sepsis due to clostridium difficile pan-colitis   Abdominal exam remains benign.  WBC is down from 40K to 20K.    Continue oral vancomycin and intravenous metronidazole     Monitor WBC, exam    Continue with clear liquids today but add Ensure clear and Magic Cup    GB distension and suspected acute cholecystitis   Currently no pain.      Will consider HIDA in a day or 2 when her clostridium difficile improves    Check LFT's in the morning     Acute kidney injury with stage 3 chronic kidney disease   AG metabolic acidosis   Hypokalemia   Mild hypernatremia   Mild hypercalcemia   Suspect that her MA is at least partially chronic from renal failure.   Renal function and electrolytes are improving.     decrease D5W rate    Continue oral NaHCO3    Monitor electrolytes and renal function     Replace K as needed     Monitor urine output      Chronic obstructive lung disease   Left upper lobe nodule   Subjective dyspnea   Tobacco use disorder   She is not hypoxic but is short of breath at rest.  Chest X-ray ordered and shows left lower lobe effusion with atelectasis.      Continue oxygen for comfort     Continue inhaled bronchodilators     Ddimer was elevated so will get lower extremity  Ultrasound    Nicotine patch     Anemia   Hemoglobin   Date Value Ref Range Status   06/07/2022 9.8 (L) 11.7 - 15.7 g/dL Final   06/06/2022 10.6 (L) 11.7 - 15.7 g/dL Final   11/11/2014 13.0 11.7 - 15.7 g/dL Final   10/07/2010 13.1 11.7 - 15.7  g/dL Final     Check iron, B12 folate and monitor      Diet: Clear Liquid Diet  Snacks/Supplements Adult: Ensure Clear; With Meals  Snacks/Supplements Adult: Magic Cup; With Meals    DVT Prophylaxis: Pneumatic Compression Devices  Crain Catheter: Not present  Central Lines: PRESENT  PICC Double Lumen 06/06/22 Right Basilic-Site Assessment: WDL  Cardiac Monitoring: None  Code Status: Full Code      Disposition Plan   Expected Discharge: 06/10/2022     Anticipated discharge location:  Awaiting care coordination huddle  Delays:          The patient's care was discussed with the Bedside Nurse and Patient.    Nasim Meng MD  Hospitalist Service  [facility]  Securely message with the [employer] Web Console (learn more here)  Text page via [employer] [email or web address]     Clinically Significant Risk Factors Present on Admission                 ______________________________________________________________________    Interval History   Some abdominal discomfort and loose stool.  Feeling a little short of breath but better with oxygen.     Data reviewed today: I reviewed all medications, new labs and imaging results over the last 24 hours. I personally reviewed no images or EKG's today.    Physical Exam   Vital Signs: Temp: 97.5  F (36.4  C) Temp src: Oral BP: 120/59 Pulse: 80   Resp: 18 SpO2: 95 % O2 Device: Nasal cannula    Weight: 100 lbs 0 oz  Constitutional: awake, alert, cooperative, no apparent distress  Respiratory: No increased work of breathing, good air exchange, clear to auscultation bilaterally, no crackles or wheezing  Cardiovascular: regular rate and rhythm, normal S1 and S2, no S3 or S4, and no murmur noted  GI: no RUQ tenderness but mild lower abdominal tenderness- no rebound/guarding  Skin: no rashes and no jaundice  Neurologic: Awake, alert  Cranial nerves II-XII are grossly intact.  Motor is 5 out of 5 bilaterally.  Neuropsychiatric: General: normal, calm and normal eye  contact    Data   Recent Labs   Lab 06/07/22  0634 06/06/22  2159 06/06/22  1615 06/06/22  0938 06/06/22  0120 06/05/22 2023 06/05/22  1017 06/05/22  0756 06/05/22  0517 06/04/22 2035 06/04/22  1151   WBC 20.2*  --   --  29.5*  --   --  40.1*  --  37.2*  --  31.6*   HGB 9.8*  --   --  10.6*  --   --   --   --  11.2*  --  12.1   MCV 96  --   --  95  --   --   --   --  96  --  96     --   --  352  --   --   --   --  336  --  324     --   --  148*  --  147* 144  --  145*   < > 142   POTASSIUM 3.4 3.4 3.0* 3.3*   < > 3.3* 3.5   < > 3.0*  3.0*   < > 3.3*   CHLORIDE 118*  --   --  120*  --  119* 119*  --  120*   < > 116*   CO2 19*  --   --  20  --  15* 10*  --  10*   < > 11*   BUN 27  --   --  36*  --  38* 40*  --  39*   < > 40*   CR 1.98*  --   --  2.56*  --  2.64* 2.69*  --  2.70*   < > 2.79*   ANIONGAP 6  --   --  8  --  13 15*  --  15*   < > 15*   CHANTE 7.0*  --   --  8.3*  --  8.4* 8.8  --  8.8   < > 10.2*   *  --   --  119*  --  116* 71  --  72   < > 94   ALBUMIN  --   --   --   --   --   --   --   --   --   --  2.5*   PROTTOTAL  --   --   --   --   --   --   --   --   --   --  6.2*   BILITOTAL  --   --   --   --   --   --   --   --   --   --  0.2   ALKPHOS  --   --   --   --   --   --   --   --   --   --  98   ALT  --   --   --   --   --   --   --   --   --   --  9   AST  --   --   --   --   --   --   --   --   --   --  19   LIPASE  --   --   --   --   --   --   --   --   --   --  63*    < > = values in this interval not displayed.     Recent Results (from the past 24 hour(s))   XR Chest 2 Views    Narrative    XR CHEST 2 VW 6/7/2022 9:10 AM    HISTORY: sob    COMPARISON: 6/5/2022      Impression    IMPRESSION: New small left pleural effusion and mild left basilar  atelectasis/consolidation.. Right arm PICC tip at the SVC/right atrial  junction. Emphysema. No pneumothorax. Normal heart size. Bilateral  breast prosthesis.    DONALD MOHR MD         SYSTEM ID:  [id number]     Medications     D5W  75 mL/hr at 06/07/22 1028       metroNIDAZOLE  500 mg Intravenous Q8H     nicotine  1 patch Transdermal Daily     nicotine   Transdermal Q8H     sodium bicarbonate  650 mg Oral 4x Daily     sodium chloride (PF)  10-40 mL Intracatheter Q8H     vancomycin  125 mg Oral 4x Daily

## 2022-06-07 NOTE — PLAN OF CARE
Summary:  Abdominal pain/Diarrhea/Pancolitis  DATE & TIME: 06/06-06/07/22 Night shift  Cognitive Concerns/ Orientation : Alert and Oriented x4, forgetful and anxious at times  BEHAVIOR & AGGRESSION TOOL COLOR: GREEN  CIWA SCORE: N/A  ABNL VS/O2: VSS on RA. Complained of SOB sats were 97-99%- provided 2L O2 NC for comfort  MOBILITY: SBAx1 GB- moves well but weak  PAIN MANAGMENT: Abdominal pain was relieved with IV dilaudid  DIET: Clears, tolerating.   BOWEL/BLADDER: Multiple loose/watery BMs this shift (x3)  ABNL LAB/BG: Na 148, Cr 2.56. K 3.4,  WBC 29.5.    DRAIN/DEVICES: R PICC infusing D5 IVF at 100 ml/hr  SKIN: Dry fragile skin. Scattered scabs and bruises. Mepilex on sacrum, left elbow, and middle of back CDI   TESTS/PROCEDURES: n/a  D/C DAY/GOALS/PLACE: Once abdominal pain resolved, diarrhea and able to tolerate regular diet  OTHER IMPORTANT INFO: Enteric precautions maintained. Continues on PO vanco on IV Flagyl; Patient once again complained of feeling SOB- like she can't breath. LS are clear. O2 sats are 98% on RA. Patient getting down/upset/anxious about condition and not feeling better.

## 2022-06-07 NOTE — PROVIDER NOTIFICATION
Notified MD that patient was feeling SOB. VSS on RA. LS were clear. MD ordered one time dose of duo neb.

## 2022-06-07 NOTE — PLAN OF CARE
Goal Outcome Evaluation:    Summary:  Abdominal pain/Diarrhea/Pancolitis  DATE & TIME: 6-6-22  [id number]  Cognitive Concerns/ Orientation : Alert and Oriented x4, calm and cooperative.   BEHAVIOR & AGGRESSION TOOL COLOR: green  CIWA SCORE: N/A  ABNL VS/O2: VSS on RA. LS diminished. C/o SOB - sats were 97-99% provided 2L O2 NC - SOB quickly resolved - sats did not drop below 97% during episode. LUNA noted  MOBILITY: Strong Ax1 GB.     PAIN MANAGMENT:pt complains of abdominal pain 7/10 - tylenol given once - pt asked for something else for pain management - MD contacted - waiting for order/response   DIET: Clears, tolerating.   BOWEL/BLADDER: Multiple loose/watery BMs this shift.   ABNL LAB/BG: Na 148, Cr 2.56. K 3.3 (replaced - currently 3.4),  WBC 29.5. Hg 10.6.   DRAIN/DEVICES: R PICC infusing D5 IVF at 100 ml/hr  SKIN: Dry fragile skin. Scattered scabs and bruises. Mepilex in place to skin tear on sacrum, left elbow, and middle of back CDI   TESTS/PROCEDURES: n/a  D/C DAY/GOALS/PLACE: pending, once abd pain resolved and pt able to tolerate diet.   OTHER IMPORTANT INFO: Enteric precautions maintained. Continues on PO vanco on IV Flagyl.

## 2022-06-07 NOTE — PLAN OF CARE
DATE & TIME: 6-7-22  AM shift  Cognitive Concerns/ Orientation : Alert and Oriented x3, disoriented to situation at times. Quite. Flat effect.   BEHAVIOR & AGGRESSION TOOL COLOR: green  ABNL VS/O2: VSS on RA. LS diminished. C/o SOB this morning, IV Dilaudid given. Pt felt a little better later in the day. MD aware of SOB. Chest xray showed Emphysema and new small L pleural effusion. Encouraged IS. Oxygen for comfort given as well.   MOBILITY: Ax1 GB. Unsteady and weak . Up the bathroom.   PAIN MANAGMENT:c/o abdominal pain and back pain this shift, IV dilaudid and PO Tylenol given.   DIET: Clears, drinking some water and apple juice.   BOWEL/BLADDER:continues with loose/mucousy at times BMs.  ABNL LAB/BG: Cr 1.98. K 3.4, replaced, recheck scheduled for 3 pm. WBC 20.2.  Hg 9.8. D dimer 4.36, MD notified.   DRAIN/DEVICES: R PICC infusing with IVF at 75 ml/hr  SKIN: Dry fragile skin. Scattered scabs and bruises all over. Mepilex in place to skin tear on sacrum, c/d/i. Mepilex in place on small open wound on upper back and skin tear on L elbow.   TESTS/PROCEDURES:chest x ray done and US of esteban LE.   D/C DAY/GOALS/PLACE: pending, once abd pain resolved and pt able to tolerate diet. Nicotine patch to R arm.   OTHER IMPORTANT INFO: Enteric precautions maintained. Continues on PO vanco on IV Flagyl.

## 2022-06-08 LAB
ALBUMIN SERPL-MCNC: 1.9 G/DL (ref 3.4–5)
ALP SERPL-CCNC: 83 U/L (ref 40–150)
ALT SERPL W P-5'-P-CCNC: 6 U/L (ref 0–50)
ANION GAP SERPL CALCULATED.3IONS-SCNC: 7 MMOL/L (ref 3–14)
AST SERPL W P-5'-P-CCNC: 12 U/L (ref 0–45)
BILIRUB SERPL-MCNC: 0.2 MG/DL (ref 0.2–1.3)
BUN SERPL-MCNC: 23 MG/DL (ref 7–30)
CALCIUM SERPL-MCNC: 7.6 MG/DL (ref 8.5–10.1)
CHLORIDE BLD-SCNC: 113 MMOL/L (ref 94–109)
CO2 SERPL-SCNC: 20 MMOL/L (ref 20–32)
CREAT SERPL-MCNC: 1.8 MG/DL (ref 0.52–1.04)
ERYTHROCYTE [DISTWIDTH] IN BLOOD BY AUTOMATED COUNT: 17.4 % (ref 10–15)
GFR SERPL CREATININE-BSD FRML MDRD: 29 ML/MIN/1.73M2
GLUCOSE BLD-MCNC: 101 MG/DL (ref 70–99)
HCT VFR BLD AUTO: 35.3 % (ref 35–47)
HGB BLD-MCNC: 11.1 G/DL (ref 11.7–15.7)
MCH RBC QN AUTO: 30.6 PG (ref 26.5–33)
MCHC RBC AUTO-ENTMCNC: 31.4 G/DL (ref 31.5–36.5)
MCV RBC AUTO: 97 FL (ref 78–100)
PLATELET # BLD AUTO: 301 10E3/UL (ref 150–450)
POTASSIUM BLD-SCNC: 3.4 MMOL/L (ref 3.4–5.3)
POTASSIUM BLD-SCNC: 3.6 MMOL/L (ref 3.4–5.3)
PROT SERPL-MCNC: 4.8 G/DL (ref 6.8–8.8)
RBC # BLD AUTO: 3.63 10E6/UL (ref 3.8–5.2)
SODIUM SERPL-SCNC: 140 MMOL/L (ref 133–144)
WBC # BLD AUTO: 20.2 10E3/UL (ref 4–11)

## 2022-06-08 PROCEDURE — 258N000003 HC RX IP 258 OP 636: Performed by: HOSPITALIST

## 2022-06-08 PROCEDURE — 250N000013 HC RX MED GY IP 250 OP 250 PS 637: Performed by: HOSPITALIST

## 2022-06-08 PROCEDURE — 80053 COMPREHEN METABOLIC PANEL: CPT | Performed by: HOSPITALIST

## 2022-06-08 PROCEDURE — 85027 COMPLETE CBC AUTOMATED: CPT | Performed by: HOSPITALIST

## 2022-06-08 PROCEDURE — 99232 SBSQ HOSP IP/OBS MODERATE 35: CPT | Performed by: HOSPITALIST

## 2022-06-08 PROCEDURE — 82040 ASSAY OF SERUM ALBUMIN: CPT | Performed by: HOSPITALIST

## 2022-06-08 PROCEDURE — 250N000011 HC RX IP 250 OP 636: Performed by: HOSPITALIST

## 2022-06-08 PROCEDURE — 999N000190 HC STATISTIC VAT ROUNDS

## 2022-06-08 PROCEDURE — 250N000011 HC RX IP 250 OP 636: Performed by: INTERNAL MEDICINE

## 2022-06-08 PROCEDURE — 84132 ASSAY OF SERUM POTASSIUM: CPT | Performed by: HOSPITALIST

## 2022-06-08 PROCEDURE — 120N000001 HC R&B MED SURG/OB

## 2022-06-08 RX ORDER — POTASSIUM CHLORIDE 1.5 G/1.58G
20 POWDER, FOR SOLUTION ORAL ONCE
Status: COMPLETED | OUTPATIENT
Start: 2022-06-08 | End: 2022-06-08

## 2022-06-08 RX ORDER — FOLIC ACID 1 MG/1
1 TABLET ORAL DAILY
Status: DISCONTINUED | OUTPATIENT
Start: 2022-06-08 | End: 2022-06-11 | Stop reason: HOSPADM

## 2022-06-08 RX ORDER — LANOLIN ALCOHOL/MO/W.PET/CERES
3 CREAM (GRAM) TOPICAL ONCE
Status: COMPLETED | OUTPATIENT
Start: 2022-06-08 | End: 2022-06-08

## 2022-06-08 RX ORDER — LANOLIN ALCOHOL/MO/W.PET/CERES
3 CREAM (GRAM) TOPICAL
Status: DISCONTINUED | OUTPATIENT
Start: 2022-06-08 | End: 2022-06-11 | Stop reason: HOSPADM

## 2022-06-08 RX ADMIN — VANCOMYCIN HYDROCHLORIDE 125 MG: 125 CAPSULE ORAL at 09:26

## 2022-06-08 RX ADMIN — MELATONIN TAB 3 MG 3 MG: 3 TAB at 01:03

## 2022-06-08 RX ADMIN — HYDROMORPHONE HYDROCHLORIDE 0.2 MG: 0.2 INJECTION, SOLUTION INTRAMUSCULAR; INTRAVENOUS; SUBCUTANEOUS at 02:24

## 2022-06-08 RX ADMIN — VANCOMYCIN HYDROCHLORIDE 125 MG: 125 CAPSULE ORAL at 13:26

## 2022-06-08 RX ADMIN — SODIUM BICARBONATE 650 MG TABLET 650 MG: at 21:47

## 2022-06-08 RX ADMIN — DEXTROSE MONOHYDRATE: 50 INJECTION, SOLUTION INTRAVENOUS at 18:15

## 2022-06-08 RX ADMIN — METRONIDAZOLE 500 MG: 500 INJECTION, SOLUTION INTRAVENOUS at 09:23

## 2022-06-08 RX ADMIN — NICOTINE 1 PATCH: 14 PATCH, EXTENDED RELEASE TRANSDERMAL at 09:28

## 2022-06-08 RX ADMIN — VANCOMYCIN HYDROCHLORIDE 125 MG: 125 CAPSULE ORAL at 18:15

## 2022-06-08 RX ADMIN — VANCOMYCIN HYDROCHLORIDE 125 MG: 125 CAPSULE ORAL at 21:47

## 2022-06-08 RX ADMIN — HYDROMORPHONE HYDROCHLORIDE 0.2 MG: 0.2 INJECTION, SOLUTION INTRAMUSCULAR; INTRAVENOUS; SUBCUTANEOUS at 10:47

## 2022-06-08 RX ADMIN — SODIUM BICARBONATE 650 MG TABLET 650 MG: at 18:15

## 2022-06-08 RX ADMIN — FOLIC ACID 1 MG: 1 TABLET ORAL at 09:26

## 2022-06-08 RX ADMIN — METRONIDAZOLE 500 MG: 500 INJECTION, SOLUTION INTRAVENOUS at 00:12

## 2022-06-08 RX ADMIN — SODIUM BICARBONATE 650 MG TABLET 650 MG: at 13:26

## 2022-06-08 RX ADMIN — METRONIDAZOLE 500 MG: 500 INJECTION, SOLUTION INTRAVENOUS at 17:06

## 2022-06-08 RX ADMIN — HYDROMORPHONE HYDROCHLORIDE 0.2 MG: 0.2 INJECTION, SOLUTION INTRAMUSCULAR; INTRAVENOUS; SUBCUTANEOUS at 23:34

## 2022-06-08 RX ADMIN — DEXTROSE MONOHYDRATE: 50 INJECTION, SOLUTION INTRAVENOUS at 02:23

## 2022-06-08 RX ADMIN — SODIUM BICARBONATE 650 MG TABLET 650 MG: at 09:26

## 2022-06-08 RX ADMIN — POTASSIUM CHLORIDE 20 MEQ: 1.5 POWDER, FOR SOLUTION ORAL at 09:25

## 2022-06-08 RX ADMIN — HYDROMORPHONE HYDROCHLORIDE 0.2 MG: 0.2 INJECTION, SOLUTION INTRAMUSCULAR; INTRAVENOUS; SUBCUTANEOUS at 18:15

## 2022-06-08 ASSESSMENT — ACTIVITIES OF DAILY LIVING (ADL)
ADLS_ACUITY_SCORE: 28
ADLS_ACUITY_SCORE: 28
ADLS_ACUITY_SCORE: 22
ADLS_ACUITY_SCORE: 28
ADLS_ACUITY_SCORE: 22
ADLS_ACUITY_SCORE: 22
ADLS_ACUITY_SCORE: 28
ADLS_ACUITY_SCORE: 24
ADLS_ACUITY_SCORE: 28
ADLS_ACUITY_SCORE: 28

## 2022-06-08 NOTE — PROGRESS NOTES
[facility]    Medicine Progress Note - Hospitalist Service    Date of Admission:  6/4/2022    Assessment & Plan      Ms. Hicks is a 77-year-old female with a past medical history significant for chronic kidney disease, bilateral kidney stones, chronic obstructive pulmonary disease, who presents to the Emergency Department with 3 days of abdominal discomfort and loose stools, found to have pancolitis and possible cholecystitis.  Stool PCR positive for clostridium difficile.    Sepsis due to clostridium difficile pan-colitis   Abdominal exam remains benign.  WBC is down from 40K to 20K.  She is feeling better.  No pain but still loose stools.    Continue oral vancomycin and intravenous metronidazole     Monitor WBC, exam    Advance diet to full liquids     GB distension and suspected acute cholecystitis   Currently no pain.      Will consider HIDA in a day or 2 when her clostridium difficile improves    Check LFT's in the morning     Acute kidney injury with stage 3 chronic kidney disease   AG metabolic acidosis   Hypokalemia   Mild hypernatremia   Mild hypercalcemia   Suspect that her MA is at least partially chronic from renal failure.   Renal function and electrolytes are improving.     decrease D5W rate    Continue oral NaHCO3    Monitor electrolytes and renal function     Replace K as needed     Monitor urine output      Chronic obstructive lung disease   Left upper lobe nodule   Subjective dyspnea   Tobacco use disorder   She is not hypoxic but is short of breath at rest.  Chest X-ray ordered and shows left lower lobe effusion with atelectasis.    Transthoracic echocardiogram showed normal LV systolic function with mild diastolic dysfunction.  No pulmonary hypertension detected.     Continue oxygen for comfort     Continue inhaled bronchodilators     Nicotine patch     Anemia   Low folic acid level  Hemoglobin   Date Value Ref Range Status   06/08/2022 11.1 (L) 11.7 - 15.7 g/dL Final    06/07/2022 9.8 (L) 11.7 - 15.7 g/dL Final   11/11/2014 13.0 11.7 - 15.7 g/dL Final   10/07/2010 13.1 11.7 - 15.7 g/dL Final     Start folic acid tabs     Diet: Full Liquid Diet    DVT Prophylaxis: Pneumatic Compression Devices  Crain Catheter: Not present  Central Lines: PRESENT  PICC Double Lumen 06/06/22 Right Basilic-Site Assessment: WDL  Cardiac Monitoring: None  Code Status: Full Code      Disposition Plan   Expected Discharge:      Anticipated discharge location:  Awaiting care coordination huddle  Delays:          The patient's care was discussed with the Bedside Nurse and Patient.    Nasim Meng MD  Hospitalist Service  [facility]  Securely message with the Vocera Web Console (learn more here)  Text page via [employer] Paging/Directory     Clinically Significant Risk Factors Present on Admission                 ______________________________________________________________________    Interval History   Some abdominal discomfort and loose stool.  Feeling a little short of breath but better with oxygen.     Data reviewed today: I reviewed all medications, new labs and imaging results over the last 24 hours. I personally reviewed no images or EKG's today.    Physical Exam   Vital Signs: Temp: 98  F (36.7  C) Temp src: Oral BP: 103/59 Pulse: 65   Resp: 18 SpO2: 97 % O2 Device: None (Room air) Oxygen Delivery: 1 LPM  Weight: 100 lbs 0 oz  Constitutional: awake, alert, cooperative, no apparent distress  Respiratory: No increased work of breathing, good air exchange, clear to auscultation bilaterally, no crackles or wheezing  Cardiovascular: regular rate and rhythm, normal S1 and S2, no S3 or S4, and no murmur noted  GI: no RUQ tenderness but mild lower abdominal tenderness- no rebound/guarding  Skin: no rashes and no jaundice  Neurologic: Awake, alert  Cranial nerves II-XII are grossly intact.  Motor is 5 out of 5 bilaterally.  Neuropsychiatric: General: normal, calm and normal  eye contact    Data   Recent Labs   Lab 22  0626 22  1455 22  0634 22  1615 22  0938 22  2035 22  1151   WBC 20.2*  --  20.2*  --  29.5*   < > 31.6*   HGB 11.1*  --  9.8*  --  10.6*   < > 12.1   MCV 97  --  96  --  95   < > 96     --  299  --  352   < > 324     --  143  --  148*   < > 142   POTASSIUM 3.4 3.8 3.4   < > 3.3*   < > 3.3*   CHLORIDE 113*  --  118*  --  120*   < > 116*   CO2 20  --  19*  --  20   < > 11*   BUN 23  --  27  --  36*   < > 40*   CR 1.80*  --  1.98*  --  2.56*   < > 2.79*   ANIONGAP 7  --  6  --  8   < > 15*   CHANTE 7.6*  --  7.0*  --  8.3*   < > 10.2*   *  --  109*  --  119*   < > 94   ALBUMIN 1.9*  --   --   --   --   --  2.5*   PROTTOTAL 4.8*  --   --   --   --   --  6.2*   BILITOTAL 0.2  --   --   --   --   --  0.2   ALKPHOS 83  --   --   --   --   --  98   ALT 6  --   --   --   --   --  9   AST 12  --   --   --   --   --  19   LIPASE  --   --   --   --   --   --  63*    < > = values in this interval not displayed.     Recent Results (from the past 24 hour(s))   Echocardiogram Complete   Result Value    LVEF  60-65%    [facility]    [id number]  [id number]  [id number]^LAN^NICK^MARI     [facility]  Echocardiography Laboratory  [address] [place] [address]     Name: SERGIO BENTLEY  MRN: [id number]  : 1945  Study Date: 2022 01:18 PM  Age: 77 yrs  Gender: Female  Patient Location: [facility]  Reason For Study: SOB  Ordering Physician: NICK MONTENEGRO  Referring Physician: NICK MONTENEGRO  Performed By: RACHEL Esposito     BSA: 1.5 m2  Height: 67 in  Weight: 100 lb  HR: 69  BP: 120/59 mmHg  ______________________________________________________________________________  Procedure  Complete Portable Echo Adult. Optison (NDC #[id number]) given intravenously.  ______________________________________________________________________________  Interpretation Summary     Left  ventricular systolic function is normal.  The visual ejection fraction is 60-65%.  Grade I or early diastolic dysfunction.  The right ventricle is normal in structure, function and size.  No significant valve dysfunction.  The inferior vena cava was normal in size with preserved respiratory  variability.  No pericardial effusion.     No prior study for comparison.  ______________________________________________________________________________  Left Ventricle  The left ventricle is normal in size. There is normal left ventricular wall  thickness. Left ventricular systolic function is normal. The visual ejection  fraction is 60-65%. Grade I or early diastolic dysfunction. Normal left  ventricular wall motion.     Right Ventricle  The right ventricle is normal in structure, function and size.     Atria  Normal left atrial size. Right atrial size is normal.     Mitral Valve  There is mild mitral annular calcification. There is mild (1+) mitral  regurgitation.     Tricuspid Valve  The tricuspid valve is normal in structure and function. Right ventricular  systolic pressure could not be approximated due to inadequate tricuspid  regurgitation.     Aortic Valve  Normal tricuspid aortic valve.     Pulmonic Valve  The pulmonic valve is normal in structure and function.     Vessels  The aortic root is normal size. Normal size ascending aorta. The inferior vena  cava was normal in size with preserved respiratory variability.     Pericardium  There is no pericardial effusion.     ______________________________________________________________________________  MMode/2D Measurements & Calculations  IVSd: 0.74 cm  LVIDd: 3.8 cm  LVIDs: 2.6 cm  LVPWd: 0.77 cm  FS: 31.3 %  LV mass(C)d: 79.3 grams  LV mass(C)dI: 52.6 grams/m2     Ao root diam: 3.5 cm  LVOT diam: 2.1 cm  LVOT area: 3.5 cm2  LA Volume (BP): 48.6 ml  LA Volume Index (BP): 32.2 ml/m2     LA Volume Indexed (AL/bp): 33.1 ml/m2  RWT: 0.40     Doppler Measurements &  Calculations  MV E max luis miguel: 61.4 cm/sec  MV A max luis miguel: 94.8 cm/sec  MV E/A: 0.65  MV dec time: 0.23 sec  LV V1 max P.9 mmHg  LV V1 max: 84.4 cm/sec  LV V1 VTI: 16.6 cm  SV(LVOT): 57.5 ml  SI(LVOT): 38.2 ml/m2  PA acc time: 0.18 sec  Pulm Sys Luis Miguel: 54.1 cm/sec  Pulm Nichole Luis Miguel: 60.6 cm/sec  Pulm A Revs Luis Miguel: 34.1 cm/sec  Pulm S/D: 0.89  E/E' av.8  Lateral E/e': 6.1  Medial E/e': 7.4     ______________________________________________________________________________  Report approved by: Vernell Dailey 2022 02:34 PM           Medications     D5W 75 mL/hr at 22 0223       folic acid  1 mg Oral Daily     metroNIDAZOLE  500 mg Intravenous Q8H     nicotine  1 patch Transdermal Daily     nicotine   Transdermal Q8H     sodium bicarbonate  650 mg Oral 4x Daily     sodium chloride (PF)  10-40 mL Intracatheter Q8H     vancomycin  125 mg Oral 4x Daily

## 2022-06-08 NOTE — PLAN OF CARE
DATE & TIME: 06/07-06/08/22 Night shift  Cognitive Concerns/ Orientation : Alert and Oriented x 4, anxious and restless overnight  BEHAVIOR & AGGRESSION TOOL COLOR: Green  ABNL VS/O2: VSS on RA. LS diminished. LUNA.   MOBILITY: SBA GB; weak; Up to the bathroom multiple times  PAIN MANAGMENT: c/o abdominal pain gave PRN IV dilaudid x1; Used warm pack x1   DIET: Clears; Good appetite- needs encouragement to take supplements  BOWEL/BLADDER: Continent of urine; can be incontinent of stool  ABNL LAB/BG: Cdiff +, WBC 20.2; D dimer 4.36.   DRAIN/DEVICES: R PICC infusing with D5 IVF at 75 ml/hr. Left arm PIV SL.  SKIN: Dry fragile skin. Scattered scabs and bruises all over. Mepilex in place to skin tear on sacrum, Mepilex in place on small open wound on upper back and skin tear on L elbow.   TESTS/PROCEDURES: CXR shows LLL effusion with atelectasis; US of BLE negative for DVT; ECHO done yesterday  D/C DAY/GOALS/PLACE: Once abd pain resolved and pt able to tolerate diet. Nicotine patch to R arm.   OTHER IMPORTANT INFO: Enteric precautions maintained. Continues on PO vanco on IV Flagyl.

## 2022-06-08 NOTE — PLAN OF CARE
Goal Outcome Evaluation:    Plan of Care Reviewed With: patient     Overall Patient Progress: improving         DATE & TIME: 6-7-22 pm shift  Cognitive Concerns/ Orientation : Alert and Oriented x 4. Disoriented to situations at times  BEHAVIOR & AGGRESSION TOOL COLOR: green  ABNL VS/O2: VSS on RA. LS diminished. LUNA. On RA, sat 97%  MOBILITY: Ax1 GB. Unsteady and weak . Up to the bathroom.   PAIN MANAGMENT:c/o abdominal pain rated 7/10. Refused tylenol, gave PRN iv dilaudid. Used warm pack once, refused further use. Refused ice pack.  DIET: Clears. Good appetite. Patient refused to drink her ensure. Kitchen staff refused to send patient magic cup because patient is on clear liquid diet.  BOWEL/BLADDER:continent of urine. Had smeared bm.  ABNL LAB/BG: Cr 1.98. K 3.4, replaced in the morning, rechecked 3.8. WBC 20.2.  Hg 9.8. D dimer 4.36.   DRAIN/DEVICES: R PICC infusing with IVF at 75 ml/hr. Left arm PIV SL.  SKIN: Dry fragile skin. Scattered scabs and bruises all over. Mepilex in place to skin tear on sacrum, c/d/i. Mepilex in place on small open wound on upper back and skin tear on L elbow.   TESTS/PROCEDURES:chest x ray done shows left lower lobe effusion with atelectasis and US of esteban LE done, no DVT   D/C DAY/GOALS/PLACE: pending, once abd pain resolved and pt able to tolerate diet. Nicotine patch to R arm.   OTHER IMPORTANT INFO: Enteric precautions maintained. Continues on PO vanco on IV Flagyl.

## 2022-06-08 NOTE — PLAN OF CARE
DATE & TIME: 6-8-22 AM shift  Cognitive Concerns/ Orientation : Alert and Oriented x4. More alert and engaging today.  BEHAVIOR & AGGRESSION TOOL COLOR: green  ABNL VS/O2: VSS on RA. LS diminished. Denies SOB. LUNA noted. Encouraged IS.   MOBILITY: Ax1 GB. Unsteady and weak . Up the bathroom. Up to the chair at times.   PAIN MANAGMENT:c/o abdominal pain and back pain this shift, IV dilaudid given. Pt reports relief.   DIET:Tolerating full liquid diet.   BOWEL/BLADDER:continues with loose/mucousy at times BMs. One large incontinent BM today as well.   ABNL LAB/BG: Cr 1.80. K 3.4, replaced, recheck pending. WBC 20.2.  Hg 11.1  DRAIN/DEVICES: R PICC infusing with IVF at 75 ml/hr  SKIN: Dry fragile skin. Scattered scabs and bruises all over. Mepilex in place to skin tear on sacrum, c/d/i. Mepilex in place on small open wound to upper back and skin tear on L elbow-dressing changed.   TESTS/PROCEDURES:n/a  D/C DAY/GOALS/PLACE: pending, once abd pain resolved and pt able to tolerate diet. Nicotine patch to L arm.   OTHER IMPORTANT INFO: Enteric precautions maintained. Continues on PO vanco on IV Flagyl.

## 2022-06-09 ENCOUNTER — APPOINTMENT (OUTPATIENT)
Dept: NUCLEAR MEDICINE | Facility: CLINIC | Age: 77
DRG: 872 | End: 2022-06-09
Attending: HOSPITALIST
Payer: COMMERCIAL

## 2022-06-09 LAB
ANION GAP SERPL CALCULATED.3IONS-SCNC: 6 MMOL/L (ref 3–14)
BACTERIA BLD CULT: NO GROWTH
BACTERIA BLD CULT: NO GROWTH
BASOPHILS # BLD MANUAL: 0.4 10E3/UL (ref 0–0.2)
BASOPHILS NFR BLD MANUAL: 2 %
BUN SERPL-MCNC: 18 MG/DL (ref 7–30)
CALCIUM SERPL-MCNC: 7.4 MG/DL (ref 8.5–10.1)
CHLORIDE BLD-SCNC: 110 MMOL/L (ref 94–109)
CO2 SERPL-SCNC: 21 MMOL/L (ref 20–32)
CREAT SERPL-MCNC: 1.63 MG/DL (ref 0.52–1.04)
CRP SERPL-MCNC: 42.7 MG/L (ref 0–8)
EOSINOPHIL # BLD MANUAL: 0 10E3/UL (ref 0–0.7)
EOSINOPHIL NFR BLD MANUAL: 0 %
ERYTHROCYTE [DISTWIDTH] IN BLOOD BY AUTOMATED COUNT: 17 % (ref 10–15)
GFR SERPL CREATININE-BSD FRML MDRD: 33 ML/MIN/1.73M2
GLUCOSE BLD-MCNC: 104 MG/DL (ref 70–99)
HCT VFR BLD AUTO: 36.7 % (ref 35–47)
HGB BLD-MCNC: 11.5 G/DL (ref 11.7–15.7)
LACTATE SERPL-SCNC: 1 MMOL/L (ref 0.7–2)
LYMPHOCYTES # BLD MANUAL: 1.1 10E3/UL (ref 0.8–5.3)
LYMPHOCYTES NFR BLD MANUAL: 5 %
MCH RBC QN AUTO: 30.6 PG (ref 26.5–33)
MCHC RBC AUTO-ENTMCNC: 31.3 G/DL (ref 31.5–36.5)
MCV RBC AUTO: 98 FL (ref 78–100)
METAMYELOCYTES # BLD MANUAL: 0.4 10E3/UL
METAMYELOCYTES NFR BLD MANUAL: 2 %
MONOCYTES # BLD MANUAL: 0.8 10E3/UL (ref 0–1.3)
MONOCYTES NFR BLD MANUAL: 4 %
MYELOCYTES # BLD MANUAL: 0.2 10E3/UL
MYELOCYTES NFR BLD MANUAL: 1 %
NEUTROPHILS # BLD MANUAL: 18.1 10E3/UL (ref 1.6–8.3)
NEUTROPHILS NFR BLD MANUAL: 86 %
PLAT MORPH BLD: ABNORMAL
PLATELET # BLD AUTO: 293 10E3/UL (ref 150–450)
POTASSIUM BLD-SCNC: 3.2 MMOL/L (ref 3.4–5.3)
POTASSIUM BLD-SCNC: 3.3 MMOL/L (ref 3.4–5.3)
POTASSIUM BLD-SCNC: 3.9 MMOL/L (ref 3.4–5.3)
PROCALCITONIN SERPL-MCNC: 0.87 NG/ML
RBC # BLD AUTO: 3.76 10E6/UL (ref 3.8–5.2)
RBC MORPH BLD: ABNORMAL
SODIUM SERPL-SCNC: 137 MMOL/L (ref 133–144)
WBC # BLD AUTO: 21 10E3/UL (ref 4–11)

## 2022-06-09 PROCEDURE — 86140 C-REACTIVE PROTEIN: CPT | Performed by: HOSPITALIST

## 2022-06-09 PROCEDURE — 343N000001 HC RX 343: Performed by: HOSPITALIST

## 2022-06-09 PROCEDURE — 80048 BASIC METABOLIC PNL TOTAL CA: CPT | Performed by: HOSPITALIST

## 2022-06-09 PROCEDURE — 250N000011 HC RX IP 250 OP 636: Performed by: INTERNAL MEDICINE

## 2022-06-09 PROCEDURE — 83605 ASSAY OF LACTIC ACID: CPT | Performed by: HOSPITALIST

## 2022-06-09 PROCEDURE — 250N000011 HC RX IP 250 OP 636: Performed by: HOSPITALIST

## 2022-06-09 PROCEDURE — 84145 PROCALCITONIN (PCT): CPT | Performed by: HOSPITALIST

## 2022-06-09 PROCEDURE — A9537 TC99M MEBROFENIN: HCPCS | Performed by: HOSPITALIST

## 2022-06-09 PROCEDURE — 84132 ASSAY OF SERUM POTASSIUM: CPT | Performed by: HOSPITALIST

## 2022-06-09 PROCEDURE — 99232 SBSQ HOSP IP/OBS MODERATE 35: CPT | Performed by: HOSPITALIST

## 2022-06-09 PROCEDURE — 250N000013 HC RX MED GY IP 250 OP 250 PS 637: Performed by: HOSPITALIST

## 2022-06-09 PROCEDURE — 78227 HEPATOBIL SYST IMAGE W/DRUG: CPT

## 2022-06-09 PROCEDURE — 120N000001 HC R&B MED SURG/OB

## 2022-06-09 PROCEDURE — 250N000013 HC RX MED GY IP 250 OP 250 PS 637: Performed by: INTERNAL MEDICINE

## 2022-06-09 PROCEDURE — 85027 COMPLETE CBC AUTOMATED: CPT | Performed by: HOSPITALIST

## 2022-06-09 PROCEDURE — 258N000003 HC RX IP 258 OP 636: Performed by: HOSPITALIST

## 2022-06-09 PROCEDURE — 85007 BL SMEAR W/DIFF WBC COUNT: CPT | Performed by: HOSPITALIST

## 2022-06-09 RX ORDER — POTASSIUM CHLORIDE 1500 MG/1
20 TABLET, EXTENDED RELEASE ORAL ONCE
Status: COMPLETED | OUTPATIENT
Start: 2022-06-09 | End: 2022-06-09

## 2022-06-09 RX ORDER — KIT FOR THE PREPARATION OF TECHNETIUM TC 99M MEBROFENIN 45 MG/10ML
6 INJECTION, POWDER, LYOPHILIZED, FOR SOLUTION INTRAVENOUS ONCE
Status: COMPLETED | OUTPATIENT
Start: 2022-06-09 | End: 2022-06-09

## 2022-06-09 RX ORDER — POTASSIUM CHLORIDE 1500 MG/1
40 TABLET, EXTENDED RELEASE ORAL ONCE
Status: COMPLETED | OUTPATIENT
Start: 2022-06-09 | End: 2022-06-09

## 2022-06-09 RX ORDER — SINCALIDE 5 UG/5ML
0.02 INJECTION, POWDER, LYOPHILIZED, FOR SOLUTION INTRAVENOUS ONCE
Status: DISCONTINUED | OUTPATIENT
Start: 2022-06-09 | End: 2022-06-09

## 2022-06-09 RX ADMIN — VANCOMYCIN HYDROCHLORIDE 125 MG: 125 CAPSULE ORAL at 21:05

## 2022-06-09 RX ADMIN — POTASSIUM CHLORIDE 40 MEQ: 1500 TABLET, EXTENDED RELEASE ORAL at 14:52

## 2022-06-09 RX ADMIN — FOLIC ACID 1 MG: 1 TABLET ORAL at 08:06

## 2022-06-09 RX ADMIN — METRONIDAZOLE 500 MG: 500 INJECTION, SOLUTION INTRAVENOUS at 16:19

## 2022-06-09 RX ADMIN — ACETAMINOPHEN 650 MG: 325 TABLET ORAL at 03:26

## 2022-06-09 RX ADMIN — DEXTROSE MONOHYDRATE: 50 INJECTION, SOLUTION INTRAVENOUS at 06:17

## 2022-06-09 RX ADMIN — METRONIDAZOLE 500 MG: 500 INJECTION, SOLUTION INTRAVENOUS at 08:11

## 2022-06-09 RX ADMIN — MEBROFENIN 6.9 MILLICURIE: 45 INJECTION, POWDER, LYOPHILIZED, FOR SOLUTION INTRAVENOUS at 13:05

## 2022-06-09 RX ADMIN — NICOTINE 1 PATCH: 14 PATCH, EXTENDED RELEASE TRANSDERMAL at 08:11

## 2022-06-09 RX ADMIN — POTASSIUM CHLORIDE 20 MEQ: 1500 TABLET, EXTENDED RELEASE ORAL at 08:06

## 2022-06-09 RX ADMIN — SODIUM BICARBONATE 650 MG TABLET 650 MG: at 12:40

## 2022-06-09 RX ADMIN — SODIUM BICARBONATE 650 MG TABLET 650 MG: at 21:05

## 2022-06-09 RX ADMIN — HYDROMORPHONE HYDROCHLORIDE 0.2 MG: 0.2 INJECTION, SOLUTION INTRAMUSCULAR; INTRAVENOUS; SUBCUTANEOUS at 08:39

## 2022-06-09 RX ADMIN — HYDROMORPHONE HYDROCHLORIDE 0.2 MG: 0.2 INJECTION, SOLUTION INTRAMUSCULAR; INTRAVENOUS; SUBCUTANEOUS at 16:31

## 2022-06-09 RX ADMIN — VANCOMYCIN HYDROCHLORIDE 125 MG: 125 CAPSULE ORAL at 12:40

## 2022-06-09 RX ADMIN — SODIUM BICARBONATE 650 MG TABLET 650 MG: at 08:06

## 2022-06-09 RX ADMIN — METRONIDAZOLE 500 MG: 500 INJECTION, SOLUTION INTRAVENOUS at 00:20

## 2022-06-09 RX ADMIN — SODIUM BICARBONATE 650 MG TABLET 650 MG: at 17:33

## 2022-06-09 RX ADMIN — HYDROMORPHONE HYDROCHLORIDE 0.2 MG: 0.2 INJECTION, SOLUTION INTRAMUSCULAR; INTRAVENOUS; SUBCUTANEOUS at 21:05

## 2022-06-09 RX ADMIN — SINCALIDE 0.9 MCG: 5 INJECTION, POWDER, LYOPHILIZED, FOR SOLUTION INTRAVENOUS at 14:00

## 2022-06-09 RX ADMIN — VANCOMYCIN HYDROCHLORIDE 125 MG: 125 CAPSULE ORAL at 08:06

## 2022-06-09 RX ADMIN — VANCOMYCIN HYDROCHLORIDE 125 MG: 125 CAPSULE ORAL at 17:33

## 2022-06-09 ASSESSMENT — ACTIVITIES OF DAILY LIVING (ADL)
ADLS_ACUITY_SCORE: 22

## 2022-06-09 NOTE — PLAN OF CARE
Goal Outcome Evaluation:    Plan of Care Reviewed With: patient     SUMMARY: Sepsis due to C.diff pancolitis  DATE & TIME: 6-8-22, [id number]  Cognitive Concerns/ Orientation : Alert and Oriented x4. More alert and engaging today.  BEHAVIOR & AGGRESSION TOOL COLOR: green  ABNL VS/O2: VSS on RA. LS diminished. Denies SOB. LUNA noted. Encouraged IS.   MOBILITY: Ax1 GB. Unsteady and weak . Up the bathroom. Up to the chair at times.   PAIN MANAGMENT:c/o abdominal pain and back pain this shift, IV dilaudid given x1. Pt reports relief.   DIET:Tolerating full liquid diet. Poor appetite  BOWEL/BLADDER: Continent. continues with green/brown loose/mucousy Bms-frequency improving per pt. (x 2-3 this evening). Abdomin still round and distended per pt.  ABNL LAB/BG: Cr 1.80. K 3.6, WBC 20.2.  Hg 11.1, albumin= 1.9   DRAIN/DEVICES: R PICC infusing D5 at 75 ml/hr   SKIN: Dry fragile skin. Scattered scabs and bruises all over. Mepilex in place to skin tear on sacrum, c/d/i. Mepilex in place on small open wound to upper back and skin tear on L elbow-dressing CDI.   TESTS/PROCEDURES:n/a  D/C DAY/GOALS/PLACE: pending, once abd pain resolved and pt able to tolerate diet. Nicotine patch to L arm.   OTHER IMPORTANT INFO: Enteric precautions maintained. Continues on PO vanco on IV Flagyl. Nicotine patch L. Arm

## 2022-06-09 NOTE — PLAN OF CARE
Goal Outcome Evaluation:                 SUMMARY: Sepsis due to C.diff pancolitis  DATE & TIME: 6/09/22 07-15:30  Cognitive Concerns/ Orientation : Alert and Oriented x4, can be forgetful.   BEHAVIOR & AGGRESSION TOOL COLOR: Green  ABNL VS/O2: VSS on RA. LUNA.   MOBILITY: Ax1 GB. Unsteady and weak . Up the bathroom.   PAIN MANAGMENT: diladid for abdomenal pain  DIET:Tolerating full liquid diet. Poor appetite  BOWEL/BLADDER: Continent. continues with green/brown loose/mucousy BMs x2 his shift  ABNL LAB/BG: k low replacement  DRAIN/DEVICES: R PICC sl  SKIN: Dry fragile skin. Scattered scabs and bruises all over. Mepilex in place to skin tear on sacrum, c/d/i. Mepilex in place on small open wound to upper back and skin tear on L elbow-dressing CDI.   TESTS/PROCEDURES:nuclear med scan of biliary  D/C DAY/GOALS/PLACE: pending, once abd pain resolved and pt able to tolerate diet.   OTHER IMPORTANT INFO: Enteric precautions maintained. Continues on PO vanco on IV Flagyl. Nicotine patch r arm

## 2022-06-09 NOTE — PROGRESS NOTES
[facility]    Medicine Progress Note - Hospitalist Service    Date of Admission:  6/4/2022    Assessment & Plan      Ms. Hicks is a 77-year-old female with a past medical history significant for chronic kidney disease, bilateral kidney stones, chronic obstructive pulmonary disease, who presents to the Emergency Department with 3 days of abdominal discomfort and loose stools, found to have pancolitis and possible cholecystitis.  Stool PCR positive for clostridium difficile.    Sepsis due to clostridium difficile pan-colitis   Abdominal exam remains benign.  WBC is down from 40K to 20K.  She is feeling better.  No pain but still loose stools. Only one this morning when I saw her. She says that she is feeling much better.     Continue oral vancomycin and intravenous metronidazole     Monitor WBC, exam    Advance diet to low fiber diet     GB distension and suspected acute cholecystitis   Currently no pain.      HIDA tomorrow     Acute kidney injury with stage 3 chronic kidney disease   AG metabolic acidosis   Hypokalemia   Mild hypernatremia   Mild hypercalcemia   Suspect that her MA is at least partially chronic from renal failure.   Renal function and electrolytes are improving.     Stop intravenous fluid     Continue oral NaHCO3    Monitor electrolytes and renal function     Replace K as needed     Monitor urine output      Chronic obstructive lung disease   Left upper lobe nodule   Subjective dyspnea   Tobacco use disorder   She is not hypoxic but is short of breath at rest.  Chest X-ray ordered and shows left lower lobe effusion with atelectasis.    Transthoracic echocardiogram showed normal LV systolic function with mild diastolic dysfunction.  No pulmonary hypertension detected.     Continue oxygen for comfort     Continue inhaled bronchodilators     Nicotine patch     Leukocytosis   Anemia   Low folic acid level  WBC still 20K with left shift     Check C-reactive protein and  pro-calcitonin     Started folic acid tabs     Diet: Low Fiber Diet    DVT Prophylaxis: Pneumatic Compression Devices  Crain Catheter: Not present  Central Lines: PRESENT  PICC Double Lumen 06/06/22 Right Basilic-Site Assessment: WDL  Cardiac Monitoring: None  Code Status: Full Code      Disposition Plan   Expected Discharge:      Anticipated discharge location:  Awaiting care coordination huddle  Delays:          The patient's care was discussed with the Bedside Nurse and Patient.    Nasim Meng MD  Hospitalist Service  [facility]  Securely message with the [employer] Web Console (learn more here)  Text page via [employer] Paging/Directory     Clinically Significant Risk Factors Present on Admission                    ______________________________________________________________________    Interval History   Some abdominal discomfort and loose stool.  Feeling a little short of breath but better with oxygen.     Data reviewed today: I reviewed all medications, new labs and imaging results over the last 24 hours. I personally reviewed no images or EKG's today.    Physical Exam   Vital Signs: Temp: 98.2  F (36.8  C) Temp src: Axillary BP: 116/83 Pulse: 89   Resp: 18 SpO2: 97 % O2 Device: None (Room air)    Weight: 100 lbs 0 oz  Constitutional: awake, alert, cooperative, no apparent distress  Respiratory: No increased work of breathing, good air exchange, clear to auscultation bilaterally, no crackles or wheezing  Cardiovascular: regular rate and rhythm, normal S1 and S2, no S3 or S4, and no murmur noted  GI: mild RUQ tenderness. Good bowel sounds   Skin: no rashes and no jaundice  Neurologic: Awake, alert  Cranial nerves II-XII are grossly intact.  Motor is 5 out of 5 bilaterally.  Neuropsychiatric: General: normal, calm and normal eye contact    Data   Recent Labs   Lab 06/09/22  0624 06/08/22  1456 06/08/22  0626 06/07/22  1455 06/07/22  0634 06/04/22  2035 06/04/22  1151   WBC 21.0*  --   20.2*  --  20.2*   < > 31.6*   HGB 11.5*  --  11.1*  --  9.8*   < > 12.1   MCV 98  --  97  --  96   < > 96     --  301  --  299   < > 324     --  140  --  143   < > 142   POTASSIUM 3.2* 3.6 3.4   < > 3.4   < > 3.3*   CHLORIDE 110*  --  113*  --  118*   < > 116*   CO2 21  --  20  --  19*   < > 11*   BUN 18  --  23  --  27   < > 40*   CR 1.63*  --  1.80*  --  1.98*   < > 2.79*   ANIONGAP 6  --  7  --  6   < > 15*   CHANTE 7.4*  --  7.6*  --  7.0*   < > 10.2*   *  --  101*  --  109*   < > 94   ALBUMIN  --   --  1.9*  --   --   --  2.5*   PROTTOTAL  --   --  4.8*  --   --   --  6.2*   BILITOTAL  --   --  0.2  --   --   --  0.2   ALKPHOS  --   --  83  --   --   --  98   ALT  --   --  6  --   --   --  9   AST  --   --  12  --   --   --  19   LIPASE  --   --   --   --   --   --  63*    < > = values in this interval not displayed.     No results found for this or any previous visit (from the past 24 hour(s)).  Medications     D5W 75 mL/hr at 06/09/22 0617       folic acid  1 mg Oral Daily     metroNIDAZOLE  500 mg Intravenous Q8H     nicotine  1 patch Transdermal Daily     nicotine   Transdermal Q8H     sodium bicarbonate  650 mg Oral 4x Daily     sodium chloride (PF)  10-40 mL Intracatheter Q8H     vancomycin  125 mg Oral 4x Daily

## 2022-06-09 NOTE — PLAN OF CARE
SUMMARY: Sepsis due to C.diff pancolitis  DATE & TIME: 6/09/22 9280-7543  Cognitive Concerns/ Orientation : Alert and Oriented x4, can be forgetful.   BEHAVIOR & AGGRESSION TOOL COLOR: Green  ABNL VS/O2: VSS on RA. LUNA.   MOBILITY: Ax1 GB. Unsteady and weak . Up the bathroom.   PAIN MANAGMENT: PRN Tylenol x1 for headache.  DIET:Tolerating full liquid diet. Poor appetite  BOWEL/BLADDER: Continent. continues with green/brown loose/mucousy BMs x3 this shift.  ABNL LAB/BG: None new  DRAIN/DEVICES: R PICC infusing D5 at 75 ml/hr   SKIN: Dry fragile skin. Scattered scabs and bruises all over. Mepilex in place to skin tear on sacrum, c/d/i. Mepilex in place on small open wound to upper back and skin tear on L elbow-dressing CDI.   TESTS/PROCEDURES:n/a  D/C DAY/GOALS/PLACE: pending, once abd pain resolved and pt able to tolerate diet.   OTHER IMPORTANT INFO: Enteric precautions maintained. Continues on PO vanco on IV Flagyl. Nicotine patch L arm

## 2022-06-10 LAB
ANION GAP SERPL CALCULATED.3IONS-SCNC: 5 MMOL/L (ref 3–14)
BASOPHILS # BLD MANUAL: 0 10E3/UL (ref 0–0.2)
BASOPHILS # BLD MANUAL: 0 10E3/UL (ref 0–0.2)
BASOPHILS NFR BLD MANUAL: 0 %
BASOPHILS NFR BLD MANUAL: 0 %
BITE CELLS BLD QL SMEAR: SLIGHT
BUN SERPL-MCNC: 17 MG/DL (ref 7–30)
BURR CELLS BLD QL SMEAR: SLIGHT
CALCIUM SERPL-MCNC: 7.4 MG/DL (ref 8.5–10.1)
CHLORIDE BLD-SCNC: 114 MMOL/L (ref 94–109)
CO2 SERPL-SCNC: 21 MMOL/L (ref 20–32)
CREAT SERPL-MCNC: 1.72 MG/DL (ref 0.52–1.04)
DACRYOCYTES BLD QL SMEAR: SLIGHT
EOSINOPHIL # BLD MANUAL: 0 10E3/UL (ref 0–0.7)
EOSINOPHIL # BLD MANUAL: 0.2 10E3/UL (ref 0–0.7)
EOSINOPHIL NFR BLD MANUAL: 0 %
EOSINOPHIL NFR BLD MANUAL: 1 %
ERYTHROCYTE [DISTWIDTH] IN BLOOD BY AUTOMATED COUNT: 16.8 % (ref 10–15)
ERYTHROCYTE [DISTWIDTH] IN BLOOD BY AUTOMATED COUNT: 16.8 % (ref 10–15)
FRAGMENTS BLD QL SMEAR: SLIGHT
GFR SERPL CREATININE-BSD FRML MDRD: 30 ML/MIN/1.73M2
GLUCOSE BLD-MCNC: 89 MG/DL (ref 70–99)
HCT VFR BLD AUTO: 35.6 % (ref 35–47)
HCT VFR BLD AUTO: 36.5 % (ref 35–47)
HGB BLD-MCNC: 11.1 G/DL (ref 11.7–15.7)
HGB BLD-MCNC: 11.3 G/DL (ref 11.7–15.7)
LACTATE SERPL-SCNC: 0.8 MMOL/L (ref 0.7–2)
LYMPHOCYTES # BLD MANUAL: 1.3 10E3/UL (ref 0.8–5.3)
LYMPHOCYTES # BLD MANUAL: 1.3 10E3/UL (ref 0.8–5.3)
LYMPHOCYTES NFR BLD MANUAL: 6 %
LYMPHOCYTES NFR BLD MANUAL: 6 %
MCH RBC QN AUTO: 30.5 PG (ref 26.5–33)
MCH RBC QN AUTO: 31.1 PG (ref 26.5–33)
MCHC RBC AUTO-ENTMCNC: 31 G/DL (ref 31.5–36.5)
MCHC RBC AUTO-ENTMCNC: 31.2 G/DL (ref 31.5–36.5)
MCV RBC AUTO: 100 FL (ref 78–100)
MCV RBC AUTO: 98 FL (ref 78–100)
METAMYELOCYTES # BLD MANUAL: 0.6 10E3/UL
METAMYELOCYTES # BLD MANUAL: 0.6 10E3/UL
METAMYELOCYTES NFR BLD MANUAL: 3 %
METAMYELOCYTES NFR BLD MANUAL: 3 %
MONOCYTES # BLD MANUAL: 0 10E3/UL (ref 0–1.3)
MONOCYTES # BLD MANUAL: 0.9 10E3/UL (ref 0–1.3)
MONOCYTES NFR BLD MANUAL: 0 %
MONOCYTES NFR BLD MANUAL: 4 %
MYELOCYTES # BLD MANUAL: 0.2 10E3/UL
MYELOCYTES # BLD MANUAL: 0.4 10E3/UL
MYELOCYTES NFR BLD MANUAL: 1 %
MYELOCYTES NFR BLD MANUAL: 2 %
NEUTROPHILS # BLD MANUAL: 17.9 10E3/UL (ref 1.6–8.3)
NEUTROPHILS # BLD MANUAL: 19 10E3/UL (ref 1.6–8.3)
NEUTROPHILS NFR BLD MANUAL: 84 %
NEUTROPHILS NFR BLD MANUAL: 90 %
NRBC # BLD AUTO: 0.2 10E3/UL
NRBC BLD MANUAL-RTO: 1 %
PLAT MORPH BLD: ABNORMAL
PLAT MORPH BLD: ABNORMAL
PLATELET # BLD AUTO: 308 10E3/UL (ref 150–450)
PLATELET # BLD AUTO: 313 10E3/UL (ref 150–450)
POTASSIUM BLD-SCNC: 3.7 MMOL/L (ref 3.4–5.3)
RBC # BLD AUTO: 3.57 10E6/UL (ref 3.8–5.2)
RBC # BLD AUTO: 3.71 10E6/UL (ref 3.8–5.2)
RBC MORPH BLD: ABNORMAL
RBC MORPH BLD: ABNORMAL
RETICS # AUTO: 0.06 10E6/UL (ref 0.03–0.1)
RETICS/RBC NFR AUTO: 1.6 % (ref 0.5–2)
SMUDGE CELLS BLD QL SMEAR: PRESENT
SODIUM SERPL-SCNC: 140 MMOL/L (ref 133–144)
WBC # BLD AUTO: 21.1 10E3/UL (ref 4–11)
WBC # BLD AUTO: 21.3 10E3/UL (ref 4–11)

## 2022-06-10 PROCEDURE — 120N000001 HC R&B MED SURG/OB

## 2022-06-10 PROCEDURE — 85027 COMPLETE CBC AUTOMATED: CPT | Performed by: HOSPITALIST

## 2022-06-10 PROCEDURE — 85045 AUTOMATED RETICULOCYTE COUNT: CPT | Performed by: HOSPITALIST

## 2022-06-10 PROCEDURE — 85060 BLOOD SMEAR INTERPRETATION: CPT | Performed by: PATHOLOGY

## 2022-06-10 PROCEDURE — 250N000011 HC RX IP 250 OP 636: Performed by: HOSPITALIST

## 2022-06-10 PROCEDURE — 999N000190 HC STATISTIC VAT ROUNDS

## 2022-06-10 PROCEDURE — 250N000013 HC RX MED GY IP 250 OP 250 PS 637: Performed by: INTERNAL MEDICINE

## 2022-06-10 PROCEDURE — 83605 ASSAY OF LACTIC ACID: CPT | Performed by: HOSPITALIST

## 2022-06-10 PROCEDURE — 85007 BL SMEAR W/DIFF WBC COUNT: CPT | Performed by: HOSPITALIST

## 2022-06-10 PROCEDURE — 82310 ASSAY OF CALCIUM: CPT | Performed by: HOSPITALIST

## 2022-06-10 PROCEDURE — 99232 SBSQ HOSP IP/OBS MODERATE 35: CPT | Performed by: HOSPITALIST

## 2022-06-10 PROCEDURE — 250N000011 HC RX IP 250 OP 636: Performed by: INTERNAL MEDICINE

## 2022-06-10 PROCEDURE — 250N000013 HC RX MED GY IP 250 OP 250 PS 637: Performed by: HOSPITALIST

## 2022-06-10 RX ADMIN — ACETAMINOPHEN 650 MG: 325 TABLET ORAL at 11:10

## 2022-06-10 RX ADMIN — ACETAMINOPHEN 650 MG: 325 TABLET ORAL at 00:07

## 2022-06-10 RX ADMIN — SODIUM BICARBONATE 650 MG TABLET 650 MG: at 21:59

## 2022-06-10 RX ADMIN — METRONIDAZOLE 500 MG: 500 INJECTION, SOLUTION INTRAVENOUS at 00:15

## 2022-06-10 RX ADMIN — VANCOMYCIN HYDROCHLORIDE 125 MG: 125 CAPSULE ORAL at 21:59

## 2022-06-10 RX ADMIN — METRONIDAZOLE 500 MG: 500 INJECTION, SOLUTION INTRAVENOUS at 23:38

## 2022-06-10 RX ADMIN — VANCOMYCIN HYDROCHLORIDE 125 MG: 125 CAPSULE ORAL at 14:59

## 2022-06-10 RX ADMIN — NICOTINE 1 PATCH: 14 PATCH, EXTENDED RELEASE TRANSDERMAL at 09:56

## 2022-06-10 RX ADMIN — FOLIC ACID 1 MG: 1 TABLET ORAL at 09:54

## 2022-06-10 RX ADMIN — ACETAMINOPHEN 650 MG: 325 TABLET ORAL at 17:53

## 2022-06-10 RX ADMIN — VANCOMYCIN HYDROCHLORIDE 125 MG: 125 CAPSULE ORAL at 17:29

## 2022-06-10 RX ADMIN — HYDROMORPHONE HYDROCHLORIDE 0.2 MG: 0.2 INJECTION, SOLUTION INTRAMUSCULAR; INTRAVENOUS; SUBCUTANEOUS at 04:03

## 2022-06-10 RX ADMIN — SODIUM BICARBONATE 650 MG TABLET 650 MG: at 14:59

## 2022-06-10 RX ADMIN — SODIUM BICARBONATE 650 MG TABLET 650 MG: at 17:29

## 2022-06-10 RX ADMIN — METRONIDAZOLE 500 MG: 500 INJECTION, SOLUTION INTRAVENOUS at 17:02

## 2022-06-10 RX ADMIN — SODIUM BICARBONATE 650 MG TABLET 650 MG: at 09:54

## 2022-06-10 RX ADMIN — VANCOMYCIN HYDROCHLORIDE 125 MG: 125 CAPSULE ORAL at 09:54

## 2022-06-10 RX ADMIN — METRONIDAZOLE 500 MG: 500 INJECTION, SOLUTION INTRAVENOUS at 07:51

## 2022-06-10 ASSESSMENT — ACTIVITIES OF DAILY LIVING (ADL)
ADLS_ACUITY_SCORE: 20
ADLS_ACUITY_SCORE: 20
ADLS_ACUITY_SCORE: 22
ADLS_ACUITY_SCORE: 21
ADLS_ACUITY_SCORE: 22
ADLS_ACUITY_SCORE: 22
ADLS_ACUITY_SCORE: 21
ADLS_ACUITY_SCORE: 21
ADLS_ACUITY_SCORE: 20
ADLS_ACUITY_SCORE: 22
ADLS_ACUITY_SCORE: 20
ADLS_ACUITY_SCORE: 22

## 2022-06-10 NOTE — PLAN OF CARE
SUMMARY: Sepsis due to C.diff pancolitis  DATE & TIME: 6/09/22 9244-1862  Cognitive Concerns/ Orientation : Alert and Oriented x4, forgetful at times.  BEHAVIOR & AGGRESSION TOOL COLOR: Green  ABNL VS/O2: VSS on RA.   MOBILITY: Ax1 GB. Unsteady and weak . Up the bathroom x2.   PAIN MANAGMENT: PRN Dilaudid given x2 for abdominal pain (effective)  DIET: Low fiber diet, tolerating, fair appetite   BOWEL/BLADDER: Continent. BM   ABNL LAB/BG: K+- 3.9, lactic 1.0, Cr -1.63, CRP 42.7  DRAIN/DEVICES: R PICC SL.  SKIN: Dry fragile skin. Scattered scabs and bruises all over. Mepilex in place to skin tear on sacrum. Mepilex in place on small open wound to upper back and skin tear on L elbow-dressing CDI.   TESTS/PROCEDURES:n/a  D/C DAY/GOALS/PLACE: pending, once abd pain resolved and pt able to tolerate diet.   OTHER IMPORTANT INFO: Enteric precautions maintained. Continues on PO vanco on IV Flagyl. Nicotine patch L arm , Sepsis protocol fire lactic collected & results w/in normal limits (1.0).

## 2022-06-10 NOTE — PROGRESS NOTES
DATE & TIME: 06/09/2022 Night    Cognitive Concerns/ Orientation : Alert/Oriented x 4, forgetful   BEHAVIOR & AGGRESSION TOOL COLOR: Green   ABNL VS/O2: VSS, room air  MOBILITY: Assist-1 gait belt  PAIN MANAGMENT: Tylenol given x 1 for headache; dilaudid x 1 for abdominal pain  DIET: Low fiber  BOWEL/BLADDER: Up to bathroom; loose stool x 1 (continent)  ABNL LAB/BG: Potassium replaced 6/9, recheck 3.9  DRAIN/DEVICES: R PICC, 2 lumens, unit collect, blood return noted, saline locked  SKIN: Fragile skin; scattered bruisesbruising   TESTS/PROCEDURES: n/a  D/C DATE: Pending abdominal pain resolved/tolerating diet  OTHER IMPORTANT INFO: Enteric isolation for C.diff (05/2022); PO vancomycin, IV flagyl

## 2022-06-10 NOTE — PLAN OF CARE
Goal Outcome Evaluation:     SUMMARY: Sepsis due to C.diff pancolitis  DATE & TIME: 06/10/2022 [id number]  Cognitive Concerns/ Orientation : Alert/Oriented x 4, forgetful   BEHAVIOR & AGGRESSION TOOL COLOR: Green   ABNL VS/O2: VSS, room air  MOBILITY: Assist-1 gait belt  PAIN MANAGMENT: Tylenol given x 1 for headache; dilaudid x 1 for abdominal pain  DIET: Low fiber  BOWEL/BLADDER: Up to bathroom (continent)  ABNL LAB/BG: Potassium 3.7, next check am labs 6/11/22  DRAIN/DEVICES: R PICC, 2 lumens, unit collect, blood return noted, saline locked  SKIN: Fragile skin; scattered bruising, Mepilex to mid back and coccyx blanchable areas  TESTS/PROCEDURES: n/a  D/C DATE: Possibly 6/11/22, home with family to assist  OTHER IMPORTANT INFO: Enteric isolation for C.diff (05/2022); PO vancomycin, IV flagyl  Pt. Stating she plans to go home tomorrow when her sister arrives from out of town to help her.

## 2022-06-10 NOTE — PROGRESS NOTES
[facility]    Medicine Progress Note - Hospitalist Service    Date of Admission:  6/4/2022    Assessment & Plan      Ms. Hicks is a 77-year-old female with a past medical history significant for chronic kidney disease, bilateral kidney stones, chronic obstructive pulmonary disease, who presents to the Emergency Department with 3 days of abdominal discomfort and loose stools, found to have pancolitis and possible cholecystitis.  Stool PCR positive for clostridium difficile.    Sepsis due to clostridium difficile pan-colitis   Abdominal exam remains benign.  WBC is down from 40K to 20K but has remained there for 3 days.   She is feeling better. No pain and fewer stools.     Continue oral vancomycin and intravenous metronidazole     Monitor WBC, exam    Continue low fiber diet     Given persistent leukocytosis, will ask GI to see her    GB distension and suspected acute cholecystitis   HIDA negative for cystic duct obstruction but is consistent with dyskinesia.  No pain this morning.  Currently no pain.      Will ask GI to review since she is being seen for clostridium difficile    Acute kidney injury with stage 3 chronic kidney disease   AG metabolic acidosis   Hypokalemia   Mild hypernatremia   Mild hypercalcemia   Suspect that her MA is at least partially chronic from renal failure.   Renal function and electrolytes are improving.     Stopped intravenous fluid     Continue oral NaHCO3    Monitor electrolytes and renal function     Replace K as needed     Monitor urine output      Chronic obstructive lung disease   Left upper lobe nodule   Subjective dyspnea   Tobacco use disorder   She is not hypoxic but is short of breath at rest.  Chest X-ray ordered and shows left lower lobe effusion with atelectasis.    Transthoracic echocardiogram showed normal LV systolic function with mild diastolic dysfunction.  No pulmonary hypertension detected.     Continue oxygen for comfort     Continue  inhaled bronchodilators     Nicotine patch     Leukocytosis   Anemia   Low folic acid level  No fever, WBC still 20K with left shift. C-reactive protein 42 and pro-calcitonin 0.87.    Will ask GI if still related to clostridium difficile or GB    Started folic acid tabs     Diet: Low Fiber Diet    DVT Prophylaxis: Pneumatic Compression Devices  Crain Catheter: Not present  Central Lines: PRESENT  PICC Double Lumen 06/06/22 Right Basilic-Site Assessment: WDL  Cardiac Monitoring: None  Code Status: Full Code      Disposition Plan   Expected Discharge:      Anticipated discharge location:  Awaiting care coordination huddle  Delays:          The patient's care was discussed with the patient     Nasim Meng MD  Hospitalist Service  [facility]  Securely message with the Vocera Web Console (learn more here)  Text page via [employer] Paging/Directory     Clinically Significant Risk Factors Present on Admission                    ______________________________________________________________________    Interval History   Feeling better- denies pain.  Tolerating low fiber diet.     Data reviewed today: I reviewed all medications, new labs and imaging results over the last 24 hours. I personally reviewed no images or EKG's today.    Physical Exam   Vital Signs: Temp: 97.2  F (36.2  C) Temp src: Oral BP: 125/68 Pulse: 100   Resp: 16 SpO2: 97 % O2 Device: None (Room air)    Weight: 100 lbs 0 oz  Constitutional: awake, alert, cooperative, no apparent distress  Respiratory: No increased work of breathing, good air exchange, clear to auscultation bilaterally, no crackles or wheezing  Cardiovascular: regular rate and rhythm, normal S1 and S2, no S3 or S4, and no murmur noted  GI: soft, mildly distended but non tender- no RUQ tenderness. Good bowel sounds  Skin: no rashes and no jaundice  Neurologic: Awake, alert  Cranial nerves II-XII are grossly intact.  Motor is 5 out of 5  bilaterally.  Neuropsychiatric: General: normal, calm and normal eye contact    Data   Recent Labs   Lab 06/10/22  0604 06/09/22  1830 06/09/22  1254 06/09/22  0624 06/08/22  1456 06/08/22  0626 06/04/22 2035 06/04/22  1151   WBC 21.3*  --   --  21.0*  --  20.2*   < > 31.6*   HGB 11.3*  --   --  11.5*  --  11.1*   < > 12.1   MCV 98  --   --  98  --  97   < > 96     --   --  293  --  301   < > 324     --   --  137  --  140   < > 142   POTASSIUM 3.7 3.9 3.3* 3.2*   < > 3.4   < > 3.3*   CHLORIDE 114*  --   --  110*  --  113*   < > 116*   CO2 21  --   --  21  --  20   < > 11*   BUN 17  --   --  18  --  23   < > 40*   CR 1.72*  --   --  1.63*  --  1.80*   < > 2.79*   ANIONGAP 5  --   --  6  --  7   < > 15*   CHANTE 7.4*  --   --  7.4*  --  7.6*   < > 10.2*   GLC 89  --   --  104*  --  101*   < > 94   ALBUMIN  --   --   --   --   --  1.9*  --  2.5*   PROTTOTAL  --   --   --   --   --  4.8*  --  6.2*   BILITOTAL  --   --   --   --   --  0.2  --  0.2   ALKPHOS  --   --   --   --   --  83  --  98   ALT  --   --   --   --   --  6  --  9   AST  --   --   --   --   --  12  --  19   LIPASE  --   --   --   --   --   --   --  63*    < > = values in this interval not displayed.     Recent Results (from the past 24 hour(s))   NM Hepatobiliary Scan w GB EF    Narrative    EXAM: NM HEPATOBILIARY SCAN WITH GB EF  LOCATION: [facility]  DATE/TIME: 6/9/2022 1:04 PM    INDICATION: Right upper quadrant abdominal pain.  COMPARISON: Abdominal ultrasound dated 06/04/2022.  TECHNIQUE: 6.9 mCi of technetium-99m mebrofenin, IV. Anterior planar imaging of the abdomen. 0.9 mcg of cholecystokinin analog, IV. Gallbladder imaging for 30-60 minutes.    FINDINGS: Normal radionuclide activity in liver, gallbladder, bile ducts, and small bowel. No evidence of cystic or common duct obstruction or intrinsic liver disease. Gallbladder ejection fraction measures 36%, which is within the normal range of 35% or   greater.       Impression    IMPRESSION:     While technically negative for cholecystitis and biliary dyskinesia, the low ejection fraction suggests an element of gallbladder dysfunction.     Medications       folic acid  1 mg Oral Daily     metroNIDAZOLE  500 mg Intravenous Q8H     nicotine  1 patch Transdermal Daily     nicotine   Transdermal Q8H     sodium bicarbonate  650 mg Oral 4x Daily     sodium chloride (PF)  10-40 mL Intracatheter Q8H     vancomycin  125 mg Oral 4x Daily

## 2022-06-10 NOTE — CONSULTS
[facility]  Gastroenterology Consultation         Veda Hicks  [address]   [facility] [address]  77 year old female    Admission Date/Time: 6/4/2022  Primary Care Provider: No Ref-Primary, Physician  Referring / Attending Physician:  Dr. Nasim Meng    We were asked to see the patient in consultation by Dr. Nasim Meng for evaluation of C difficile.      CC: abdominal pain and diarrhea    HPI:  Veda Hicks is a 77 year old female who has a past medical history significant for chronic kidney disease, bilateral kidney stones, chronic obstructive pulmonary disease, who presented with abdominal pain and loose stools, found to have pancolitis with C difficile with possible cholecystitis.  Had over 10 stools daily prior to admission, with start of vancomycin has had 3-4 loose stools. Abdominal pain has now resolved. Denies chills or diaphoresis.    Has h/o C difficile 10 years ago. Has a colonoscopy last within last 3-5 years and told was normal. NO record available. Has mild abdominal distention. Tolerating diet. Reports is quite frustrated and wants to go home.    Her labs are significant for C difficile toxin positive, WBC 21->21.3,k, Hemoglobin 11.5, Procalcitonin 0.87. CRP 42.7.  CT notes moderate diffuse bowel wall thickening throughout colon and consistent with a nonspecific pancolitis, emphysema, gallbladder distended- suspicious for cholecystitis. HIDA scan negative for cholecystitis but had decreased EF at 36 %.    ROS: A comprehensive ten point review of systems was negative aside from those in mentioned in the HPI.      PAST MED HX:  I have reviewed this patient's medical history and updated it with pertinent information if needed.   Past Medical History:   Diagnosis Date     Chronic kidney disease     kidney stones       MEDICATIONS:   Prior to Admission Medications   Prescriptions Last Dose Informant Patient Reported? Taking?   ibuprofen  (ADVIL/MOTRIN) 200 MG tablet prn Self Yes Yes   Sig: Take 600 mg by mouth every 6 hours as needed for mild pain      Facility-Administered Medications: None       ALLERGIES: No Known Allergies    SOCIAL HISTORY:  Social History     Tobacco Use     Smoking status: Current Every Day Smoker     Packs/day: 1.00     Years: 47.00     Pack years: 47.00     Smokeless tobacco: Never Used   Substance Use Topics     Alcohol use: No     Drug use: No       FAMILY HISTORY:  Family History   Problem Relation Age of Onset     Cancer Father         lung     Prostate Cancer Brother      Prostate Cancer Maternal Grandmother         kidney     Prostate Cancer Maternal Grandfather         kidney     Heart Disease Paternal Grandfather        PHYSICAL EXAM:   General  Alert, oriented and comfortable  Vital Signs with Ranges  Temp: 97.2  F (36.2  C) Temp src: Oral BP: 125/68 Pulse: 100   Resp: 16 SpO2: 97 % O2 Device: None (Room air)    I/O last 3 completed shifts:  In: 400 [P.O.:400]  Out: -     Constitutional: healthy, alert and no distress   Cardiovascular: negative, PMI normal. No lifts, heaves, or thrills. RRR. No murmurs, clicks gallops or rub  Respiratory: negative, Percussion normal. Good diaphragmatic excursion. Lungs clear  Abdomen: Abdomen soft, non-tender. BS normal. No masses, organomegaly, positive findings: mildly distended      ADDITIONAL COMMENTS:   I reviewed the patient's new clinical lab test results.   Recent Labs   Lab Test 06/10/22  0604 06/09/22  0624 06/08/22  0626   WBC 21.3* 21.0* 20.2*   HGB 11.3* 11.5* 11.1*   MCV 98 98 97    293 301     Recent Labs   Lab Test 06/10/22  0604 06/09/22  1830 06/09/22  1254 06/09/22  0624 06/08/22  1456 06/08/22  0626   POTASSIUM 3.7 3.9 3.3* 3.2*   < > 3.4   CHLORIDE 114*  --   --  110*  --  113*   CO2 21  --   --  21  --  20   BUN 17  --   --  18  --  23   ANIONGAP 5  --   --  6  --  7    < > = values in this interval not displayed.     Recent Labs   Lab Test  06/08/22  0626 06/05/22  0422 06/04/22  1151 01/08/21  1121 06/21/18  1504   ALBUMIN 1.9*  --  2.5*  --   --    BILITOTAL 0.2  --  0.2  --   --    ALT 6  --  9  --   --    AST 12  --  19  --   --    PROTEIN  --  100 *  --  50 mg/dL* 100 mg/dL*   LIPASE  --   --  63*  --   --        I reviewed the patient's new imaging results.        CONSULTATION ASSESSMENT AND PLAN:      Veda Hicks is a 77 year old female with c/o abdominal pain and diarrhea, diagnosed with C difficile and doing well with no current complaints.     Active Problems:  Pancolitis (H)  Sepsis due to clostridium difficile pan-colitis   CT notes moderate diffuse bowel wall thickening throughout colon and consistent with a nonspecific pancolitis, emphysema, gallbladder distended- suspicious for cholecystitis  C difficile toxin positive  WBC 40k->20k  Improved with start of vancomycin 125 mg QID and IV metronidazole  CT findings consistent with C difficile colitis  Further evaluation with colonoscopy would be recommended if repeat CT reveals persistent colitis    -- Continue with current treatment (vancomycin 125 mg QID for 14 days, Metronidazole could be held at discharge)  -- Outpatient f/u to ensure no recurrence and close f/u  -- Diet as tolerated  -- Daily labs    Leukocytosis  WBC 40k->20k- but remains around 20-21k  WBC 05/21k normal 12/21 15.7k  Maybe due to C difficile but concern for other source- ie hematologic      [facility].  Office: [phone]  Cell : [phone] (Dr. Valderrama)  Cell: [phone] (Meme Escobar PA-C)

## 2022-06-11 VITALS
OXYGEN SATURATION: 100 % | TEMPERATURE: 97.8 F | WEIGHT: 100 LBS | BODY MASS INDEX: 15.7 KG/M2 | RESPIRATION RATE: 18 BRPM | DIASTOLIC BLOOD PRESSURE: 61 MMHG | HEART RATE: 93 BPM | HEIGHT: 67 IN | SYSTOLIC BLOOD PRESSURE: 100 MMHG

## 2022-06-11 LAB
ANION GAP SERPL CALCULATED.3IONS-SCNC: 7 MMOL/L (ref 3–14)
BUN SERPL-MCNC: 17 MG/DL (ref 7–30)
CALCIUM SERPL-MCNC: 6.9 MG/DL (ref 8.5–10.1)
CHLORIDE BLD-SCNC: 117 MMOL/L (ref 94–109)
CO2 SERPL-SCNC: 21 MMOL/L (ref 20–32)
CREAT SERPL-MCNC: 1.56 MG/DL (ref 0.52–1.04)
CRP SERPL-MCNC: 19.9 MG/L (ref 0–8)
GFR SERPL CREATININE-BSD FRML MDRD: 34 ML/MIN/1.73M2
GLUCOSE BLD-MCNC: 90 MG/DL (ref 70–99)
POTASSIUM BLD-SCNC: 3 MMOL/L (ref 3.4–5.3)
POTASSIUM BLD-SCNC: 3.2 MMOL/L (ref 3.4–5.3)
PROCALCITONIN SERPL-MCNC: 0.51 NG/ML
SODIUM SERPL-SCNC: 145 MMOL/L (ref 133–144)

## 2022-06-11 PROCEDURE — 250N000013 HC RX MED GY IP 250 OP 250 PS 637: Performed by: HOSPITALIST

## 2022-06-11 PROCEDURE — 84145 PROCALCITONIN (PCT): CPT | Performed by: HOSPITALIST

## 2022-06-11 PROCEDURE — 84132 ASSAY OF SERUM POTASSIUM: CPT | Performed by: INTERNAL MEDICINE

## 2022-06-11 PROCEDURE — 80048 BASIC METABOLIC PNL TOTAL CA: CPT | Performed by: HOSPITALIST

## 2022-06-11 PROCEDURE — 86140 C-REACTIVE PROTEIN: CPT | Performed by: HOSPITALIST

## 2022-06-11 PROCEDURE — 250N000013 HC RX MED GY IP 250 OP 250 PS 637: Performed by: INTERNAL MEDICINE

## 2022-06-11 PROCEDURE — 99239 HOSP IP/OBS DSCHRG MGMT >30: CPT | Performed by: INTERNAL MEDICINE

## 2022-06-11 PROCEDURE — 999N000190 HC STATISTIC VAT ROUNDS

## 2022-06-11 PROCEDURE — G0463 HOSPITAL OUTPT CLINIC VISIT: HCPCS

## 2022-06-11 RX ORDER — FOLIC ACID 1 MG/1
1 TABLET ORAL DAILY
Qty: 30 TABLET | Refills: 0 | Status: SHIPPED | OUTPATIENT
Start: 2022-06-12

## 2022-06-11 RX ORDER — VANCOMYCIN HYDROCHLORIDE 125 MG/1
125 CAPSULE ORAL 4 TIMES DAILY
Qty: 30 CAPSULE | Refills: 0 | Status: SHIPPED | OUTPATIENT
Start: 2022-06-11

## 2022-06-11 RX ORDER — ACETAMINOPHEN 325 MG/1
650 TABLET ORAL EVERY 6 HOURS PRN
COMMUNITY
Start: 2022-06-11

## 2022-06-11 RX ORDER — POTASSIUM CHLORIDE 1500 MG/1
20 TABLET, EXTENDED RELEASE ORAL 2 TIMES DAILY
Qty: 10 TABLET | Refills: 0 | Status: SHIPPED | OUTPATIENT
Start: 2022-06-11

## 2022-06-11 RX ORDER — NICOTINE 21 MG/24HR
1 PATCH, TRANSDERMAL 24 HOURS TRANSDERMAL DAILY
Qty: 10 PATCH | Refills: 0 | Status: SHIPPED | OUTPATIENT
Start: 2022-06-12

## 2022-06-11 RX ORDER — POTASSIUM CHLORIDE 1500 MG/1
40 TABLET, EXTENDED RELEASE ORAL ONCE
Status: COMPLETED | OUTPATIENT
Start: 2022-06-11 | End: 2022-06-11

## 2022-06-11 RX ORDER — POTASSIUM CHLORIDE 1500 MG/1
20 TABLET, EXTENDED RELEASE ORAL ONCE
Status: COMPLETED | OUTPATIENT
Start: 2022-06-11 | End: 2022-06-11

## 2022-06-11 RX ADMIN — POTASSIUM CHLORIDE 40 MEQ: 1500 TABLET, EXTENDED RELEASE ORAL at 16:46

## 2022-06-11 RX ADMIN — FOLIC ACID 1 MG: 1 TABLET ORAL at 08:37

## 2022-06-11 RX ADMIN — SODIUM BICARBONATE 650 MG TABLET 650 MG: at 08:37

## 2022-06-11 RX ADMIN — POTASSIUM CHLORIDE 20 MEQ: 1500 TABLET, EXTENDED RELEASE ORAL at 08:56

## 2022-06-11 RX ADMIN — VANCOMYCIN HYDROCHLORIDE 125 MG: 125 CAPSULE ORAL at 17:46

## 2022-06-11 RX ADMIN — ACETAMINOPHEN 650 MG: 325 TABLET ORAL at 00:50

## 2022-06-11 RX ADMIN — VANCOMYCIN HYDROCHLORIDE 125 MG: 125 CAPSULE ORAL at 14:23

## 2022-06-11 RX ADMIN — VANCOMYCIN HYDROCHLORIDE 125 MG: 125 CAPSULE ORAL at 08:37

## 2022-06-11 RX ADMIN — ACETAMINOPHEN 650 MG: 325 TABLET ORAL at 06:53

## 2022-06-11 RX ADMIN — NICOTINE 1 PATCH: 14 PATCH, EXTENDED RELEASE TRANSDERMAL at 08:40

## 2022-06-11 RX ADMIN — ACETAMINOPHEN 650 MG: 325 TABLET ORAL at 13:30

## 2022-06-11 ASSESSMENT — ACTIVITIES OF DAILY LIVING (ADL)
ADLS_ACUITY_SCORE: 20

## 2022-06-11 NOTE — PLAN OF CARE
Discharge    Patient discharged to home via personal transportation  Care plan note; Pt A&Ox4, ambulating independently. Patient's discharge paperwork gone through with patient and her sister. Answered questions about when/how to follow up with PCP, and when to come back to ED if needed.    Listed belongings gathered and given to patient (including from security/pharmacy). Yes  Care Plan and Patient education resolved: Yes  Prescriptions if needed, hard copies sent with patient  NA  Medication Bin checked and emptied on discharge Yes  SW/care coordinator/charge RN aware of discharge: Yes

## 2022-06-11 NOTE — PLAN OF CARE
PT:  Physical therapy evaluation orders received and reviewed.  Per thorough chart review and discussion with RN, patient is up independently in room and plans to discharge home with assistance provided by sister.  Discharge possibly today.  Patient with no IP PT needs identified, PT orders completed.

## 2022-06-11 NOTE — PLAN OF CARE
Goal Outcome Evaluation:      DATE & TIME: 06/10/2022 6103-7887  Cognitive Concerns/ Orientation : Alert/Oriented x 4, forgetful   BEHAVIOR & AGGRESSION TOOL COLOR: Green, calm/cooperative   ABNL VS/O2: VSS on RA  MOBILITY: Independently in room, calls appropriately  PAIN MANAGMENT: Tylenol given x 1 for headache with good relief  DIET: Low fiber, poor appetite, states ate only breakfast, fair fluid intake  BOWEL/BLADDER: continent, up to BR, reports loose/watery stools  ABNL LAB/BG:  WBC 21.1, Creat 1.72  DRAIN/DEVICES: R PICC, 2 lumens SL  SKIN: Fragile skin; scattered bruising  TESTS/PROCEDURES: n/a  D/C DATE: Possibly home today with family  OTHER IMPORTANT INFO: Pt pleasant, denies N/V, c/o HA-managed with Tylenol, Enteric isolation for C.diff (05/2022); PO Vancomycin, IV Flagyl.

## 2022-06-11 NOTE — PLAN OF CARE
Goal Outcome Evaluation:    SUMMARY: Sepsis due to C.diff pancolitis  DATE & TIME: 06/11/2022 8750-5220  Cognitive Concerns/ Orientation : Alert/Oriented x 4, forgetful   BEHAVIOR & AGGRESSION TOOL COLOR: Green, calm/cooperative   ABNL VS/O2: VSS on RA  MOBILITY: Independently in room, calls appropriately  PAIN MANAGMENT: Tylenol given x 1 for Bilateral ankle/foot discomfort  DIET: Low fiber, good appetite   BOWEL/BLADDER: continent, up to BR, 2x  loose/watery stools this shift  ABNL LAB/BG: K 3.2, replaced 20mEq recheck at 1300, creatinine 1.56, CRP 19.9,   DRAIN/DEVICES: R PICC, 2 lumens, unit collect, blood return noted, saline locked  SKIN: Fragile skin; scattered bruising. Mepilex to mid back and coccyx   TESTS/PROCEDURES: n/a  D/C DATE: Possibly home today with family  OTHER IMPORTANT INFO: Pt  denies N/V, c/o HA-managed with Tylenol, Enteric isolation for C.diff (05/2022); PO vancomycin

## 2022-06-11 NOTE — PLAN OF CARE
SUMMARY: Sepsis due to C.diff pancolitis  DATE & TIME: 06/10/2022 0905-0269  Cognitive Concerns/ Orientation : Alert/Oriented x 4, forgetful   BEHAVIOR & AGGRESSION TOOL COLOR: Green   ABNL VS/O2: VSS, room air. Sepsis fired- unclear why. Lactic acid came back at 0.8  MOBILITY: Independently transfers from chair to bed. Calls appropriately  PAIN MANAGMENT: Tylenol given x 1 for headache- effective  DIET: Low fiber- tolerating well. Said she had a big lunch, didn't eat much for dinner.  BOWEL/BLADDER: Up to bathroom (continent), reports stools still loose/watery  ABNL LAB/BG:  WBC 21.1, Creat 1.72  DRAIN/DEVICES: R PICC, 2 lumens, unit collect, blood return noted, saline locked  SKIN: Fragile skin; scattered bruising  TESTS/PROCEDURES: n/a  D/C DATE: Possibly 6/11/22, home with family to assist  OTHER IMPORTANT INFO: Enteric isolation for C.diff (05/2022); PO vancomycin, IV flagyl  Pt. Stating she plans to go home tomorrow when her sister arrives from out of town to help her.

## 2022-06-11 NOTE — DISCHARGE SUMMARY
[facility]    Discharge Summary  Hospitalist    Date of Admission:  6/4/2022  Date of Discharge:  6/11/2022  6:03 PM  Provider:  Mindy Mcgarry DO    Discharge Diagnoses   1.  Acute C diff colitis  2.   Acute on chronic kidney disease  3.  Leukocytosis  4.  Tobacco abuse  5.  Hypokalemia  6.  AG metabolic acidosis, resolved  7.  Lymphedema      Other medical issues:  Past Medical History:   Diagnosis Date     Chronic kidney disease     kidney stones       History of Present Illness   Veda Hicks is an 77 year old female who presented with abd pain and loose stools.  Please see the admission history and physical for full details.    Hospital Course   Veda Hicks was admitted on 6/4/2022.  The following problems were addressed during her hospitalization:    1.  Acute, severe Clostridium difficle colitis       Past c diff infection 10 years ago    Ms. Hicks is a 78 yo female pt who presented to the ED with abd pain and loose, watery stools.  CT imaging in the ED with diffuse, pan-colitis.  C diff toxin positive (6/5/22).  She was started on IV flagyl (completed 5 days) and po vanco.  GI was consulted and recommended 14 day total course of po vanco.      She was still having softer stools with some urgency on day of discharge but was clinically much improved and she felt that she would be able to manage these symptoms at home with her family.      2.  Gallbladder distension on CT imaging    CT of the abd revealed findings of a distended gallbladder.  HIDA scan was obtained that was negative for evidence of acute cholecystitis    3.  Acute on top of chronic renal insufficiency       Metabolic acidosis    She was noted to have increase in creat on hospital admission associated with a metabolic acidosis.  She received IVF initially with bicarbonate and then oral bicarb with resolution of her metabolic acidosis.  Creat also improved with improvement of her infectious process and IVF  given during her hospital stay.    4.  Hypokalemia    She had intermittent hypokalemia during her hospital stay and was provided with electrolyte supplementation with close monitoring in the setting of acute on chronic renal insufficiency.  Her potassium was still low at 3.0 on afternoon of day of discharge.  She was discharged on po bid potassium with repeat BMP scheduled this next week with PCP for recheck of electrolytes and creat.      Magnesium was slightly elevated and not low during her hospital stay.  This could also be checked in clinic f/up if hypokalemia continues to be a challenge.    5.  Lymphedema    She has had difficulty in the past with chronic lymphedema.  Her ankles were swollen at time of discharge.  She declined ace wraps as she states that she has her own lymphedema wraps that she can put on when she gets home    6.  Tobacco abuse    Cessation encouraged.  She tolerated a nicotene patch which hospitalized and was given a rx for patches at time of discharge.     7.  Leukocytosis    She had persistent leukocytosis despite clinical improvement of infectious process.  She should have CBC in hospital f/up.  If leukocytosis persists after treatment of her colitis; then would consider outpt hematology appointment.  She voices understanding of this recommendation.  Outpt heme consult ordered.      Significant Results and Procedures   none    Pending Results   Unresulted Labs Ordered in the Past 30 Days of this Admission     Date and Time Order Name Status Description    6/10/2022  4:55 PM Bld morphology pathology review In process           Code Status   Full Code       Primary Care Physician   Physician No Ref-Primary    Physical Exam                      Vitals:    06/04/22 1139 06/11/22 1500   Weight: 45.4 kg (100 lb) 45.4 kg (100 lb)     Vital Signs with Ranges     I/O last 3 completed shifts:  In: 480 [P.O.:480]  Out: -     PT SEEN AND EXAMINED ON DAY OF D/C  GEN:  Alert, oriented x 3,  comfortable, NAD.   HEENT:  Normocephalic/atraumatic, PERRL, no scleral icterus, no nasal discharge, mouth and membranes appear moist  CV:  Regular rate and rhythm, no loud murmur to ausc.  S1 + S2 noted, no S3 or S4.  LUNGS:  Clear to auscultation ant/lat bilaterally.  No rales/rhonchi/wheezing auscultated bilaterally.  No costal retractions bilaterally.  Symmetric chest rise on inhalation noted.  ABD:  Active bowel sounds, soft, non-tender, not significantly distended.  No clear rebound/guarding/rigidity.  No masses palpated.  No obvious HSM to exam.  EXT:  2+ ankle edema; 1+ pretibial pitting edema bilaterally.  No cyanosis bilaterally. No joint synovitis noted.  No calf-tenderness or asymmetry noted.  SKIN:  Dry to touch, no rashes or jaundice noted.  PSYCH:  Mood appropriate, Not tearful or depressed.  Maintains direct eye contact.  NEURO:  No tremors at rest, speech is clear and appropriate.     Discharge Disposition   Discharged to home    Consultations This Hospital Stay   SURGERY GENERAL IP CONSULT  VASCULAR ACCESS ADULT IP CONSULT  VASCULAR ACCESS ADULT IP CONSULT  GASTROENTEROLOGY IP CONSULT  PHYSICAL THERAPY ADULT IP CONSULT  PHYSICAL THERAPY ADULT IP CONSULT  LYMPHEDEMA THERAPY IP CONSULT  VASCULAR ACCESS ADULT IP CONSULT    Time Spent on this Encounter   I, Mindy Mcgarry DO, personally saw the patient today and spent greater than 30 minutes discharging this patient.    Discharge Orders      Oncology/Hematology Adult Referral      Reason for your hospital stay    You were admitted with loose stools and found to have acute C diff infection     Activity    Your activity upon discharge: activity as tolerated     Discharge Instructions    Elevate legs when supine for leg swelling  Resume lymphadema stockings when you get home     Follow-up and recommended labs and tests     F/up with PCP in 5 days with a preclinical BMP, CBC     Diet    Follow this diet upon discharge:       Low Fiber Diet, high  protein     Discharge Medications   Discharge Medication List as of 6/11/2022  5:43 PM      START taking these medications    Details   acetaminophen (TYLENOL) 325 MG tablet Take 2 tablets (650 mg) by mouth every 6 hours as needed for mild pain or other (and adjunct with moderate or severe pain or per patient request), OTC      folic acid (FOLVITE) 1 MG tablet Take 1 tablet (1 mg) by mouth daily, Disp-30 tablet, R-0, E-Prescribe      nicotine (NICODERM CQ) 14 MG/24HR 24 hr patch Place 1 patch onto the skin daily Do not smoke while using this patch, Disp-10 patch, R-0, E-Prescribe      potassium chloride ER (KLOR-CON M) 20 MEQ CR tablet Take 1 tablet (20 mEq) by mouth 2 times daily, Disp-10 tablet, R-0, E-Prescribe      vancomycin (VANCOCIN) 125 MG capsule Take 1 capsule (125 mg) by mouth 4 times daily, Disp-30 capsule, R-0, E-Prescribe         CONTINUE these medications which have NOT CHANGED    Details   ibuprofen (ADVIL/MOTRIN) 200 MG tablet Take 600 mg by mouth every 6 hours as needed for mild pain, Historical           Allergies   No Known Allergies  Data   Recent Labs   Lab 06/10/22  1655 06/10/22  0604 06/09/22  0624   WBC 21.1* 21.3* 21.0*   HGB 11.1* 11.3* 11.5*   HCT 35.6 36.5 36.7    98 98    308 293     Recent Labs   Lab 06/11/22  1418 06/11/22  0602 06/10/22  0604 06/09/22  1254 06/09/22  0624   NA  --  145* 140  --  137   POTASSIUM 3.0* 3.2* 3.7   < > 3.2*   CHLORIDE  --  117* 114*  --  110*   CO2  --  21 21  --  21   ANIONGAP  --  7 5  --  6   GLC  --  90 89  --  104*   BUN  --  17 17  --  18   CR  --  1.56* 1.72*  --  1.63*   GFRESTIMATED  --  34* 30*  --  33*   CHANTE  --  6.9* 7.4*  --  7.4*    < > = values in this interval not displayed.     No results for input(s): CULT in the last 168 hours.  Recent Labs   Lab 06/11/22  1418 06/11/22  0602 06/10/22  0604 06/09/22  1254 06/09/22  0624 06/08/22  1456 06/08/22  0626   NA  --  145* 140  --  137  --  140   POTASSIUM 3.0* 3.2* 3.7   < > 3.2*    < > 3.4   CHLORIDE  --  117* 114*  --  110*  --  113*   CO2  --  21 21  --  21  --  20   ANIONGAP  --  7 5  --  6  --  7   GLC  --  90 89  --  104*  --  101*   BUN  --  17 17  --  18  --  23   CR  --  1.56* 1.72*  --  1.63*  --  1.80*   GFRESTIMATED  --  34* 30*  --  33*  --  29*   CHANTE  --  6.9* 7.4*  --  7.4*  --  7.6*   PROTTOTAL  --   --   --   --   --   --  4.8*   ALBUMIN  --   --   --   --   --   --  1.9*   BILITOTAL  --   --   --   --   --   --  0.2   ALKPHOS  --   --   --   --   --   --  83   AST  --   --   --   --   --   --  12   ALT  --   --   --   --   --   --  6    < > = values in this interval not displayed.     No results for input(s): COLOR, APPEARANCE, URINEGLC, URINEBILI, URINEKETONE, SG, UBLD, URINEPH, PROTEIN, UROBILINOGEN, NITRITE, LEUKEST, RBCU, WBCU in the last 168 hours.  Results for orders placed or performed during the hospital encounter of 06/04/22   CT Chest Abdomen Pelvis w/o Contrast    Narrative    EXAM: CT CHEST, ABDOMEN, PELVIS WITHOUT CONTRAST  LOCATION: [facility]  DATE/TIME: 06/04/2022, 1:14 PM    INDICATION: Shortness of breath. Abdominal pain.  COMPARISON: None.  TECHNIQUE: CT scan of the chest, abdomen, and pelvis was performed without IV contrast. Multiplanar reformats were obtained. Dose reduction techniques were used.   CONTRAST: None.    FINDINGS:   LUNGS AND PLEURA: No pleural effusions. No pneumothorax. Moderate changes of centrilobular emphysema throughout both lungs. Indeterminate 0.4 cm left upper lobe pulmonary nodule (series 4 image 149).    MEDIASTINUM/AXILLAE: No enlarged lymph nodes in the chest. No pericardial effusion. Peripherally calcified bilateral breast implants are noted. There is moderate atherosclerotic calcification of the thoracic aorta.    CORONARY ARTERY CALCIFICATION: Moderate.    HEPATOBILIARY: A few small hepatic cysts would require no routine follow-up. Scattered smaller hypodensities in the liver are too small to  characterize, but could also represent cysts. Gallbladder is distended. No calcified gallstones.    PANCREAS: Normal.    SPLEEN: 2.9 cm cyst is noted in the spleen inferiorly.    ADRENAL GLANDS: Normal.    KIDNEYS/BLADDER: There are at least four nonobstructing stones in the right kidney, with the largest measuring 0.4 cm in the lower pole. There are at least eight nonobstructing stones in the left kidney, with the largest measuring 0.9 cm in the lower   pole. There are subcentimeter hyperdense lesions in both kidneys, with the largest in the lower pole on the right measuring 0.9 cm. No hydronephrosis.    BOWEL: Moderate diffuse bowel wall thickening throughout the colon is consistent with a nonspecific pancolitis. No bowel obstruction.    LYMPH NODES: No lymphadenopathy.    VASCULATURE: Moderate atherosclerotic aortoiliac calcification.    PELVIC ORGANS: Hysterectomy. Multiple images through the pelvis are degraded by artifact related to a right hip arthroplasty.    MUSCULOSKELETAL: Degenerative changes at the lumbosacral interspace and within the left hip.      Impression    IMPRESSION:  1.  Moderate diffuse bowel wall thickening throughout the colon is consistent with a nonspecific pancolitis.  2.  Emphysema.  3.  Multiple nonobstructing stones in both kidneys, with the largest on the left measuring 0.9 cm.  4.  The gallbladder is distended. If there is clinical suspicion for cholecystitis, gallbladder ultrasound should be considered.  5.  Indeterminate 0.4 cm left upper lobe pulmonary nodule. Please refer to pulmonary nodule follow-up guidelines below.  6.  Subcentimeter hyperdense lesions are noted in both kidneys. These are indeterminate on this noncontrast scan, but most likely represent hemorrhagic or proteinaceous renal cysts.    REFERENCE:  Guidelines for Management of Incidental Pulmonary Nodules Detected on CT Images: From the Fleischner Society 2017.   Guidelines apply to incidental nodules in patients  who are 35 years or older.  Guidelines do not apply to lung cancer screening, patients with immunosuppression, or patients with known primary cancer.    SINGLE NODULE  Nodule size <6 mm  Low-risk patients: No follow-up needed.  High-risk patients: Optional follow-up at 12 months.    Nodule size 6-8 mm  Low-risk patients: Follow-up CT at 6-12 months, then consider CT at 18-24 months.  High-risk patients: Follow-up CT at 6-12 months, then at 18-24 months if no change.    Nodule size >8 mm  Either low or high-risk patients:  Consider CT, PET/CT, or tissue sampling at 3 months.    Consider referral to lung nodule clinic.     US Abdomen Limited (RUQ)    Narrative    EXAM: ULTRASOUND ABDOMEN LIMITED  LOCATION: [facility]  DATE/TIME: 06/04/2022, 3:27 PM    INDICATION: Abdominal pain, ? cholecystitis.  COMPARISON: None.  TECHNIQUE: Limited abdominal ultrasound.    FINDINGS:    GALLBLADDER: Gallbladder is distended measuring 11 mm in length and 4.5 cm in diameter. No wall thickening or cholelithiasis. Positive sonographic Garcia's sign.    BILE DUCTS: Extrahepatic biliary dilatation. The common duct measures 9 mm.    LIVER: Normal parenchyma with smooth contour. No focal mass. Simple cyst.    RIGHT KIDNEY: No hydronephrosis. Multiple simple cysts. Possible shadowing stone measuring 8 mm .    PANCREAS: The visualized portions are normal.    No ascites.      Impression    IMPRESSION:  1.  Distended gallbladder with focal tenderness over the gallbladder. No definite pathological thickening or gallstones demonstrated.  2.  Mild extrahepatic biliary dilatation of uncertain etiology.  3.  Possible 8 mm nonobstructing stone in the right kidney.     XR Chest 2 Views    Narrative    EXAM: XR CHEST 2 VW  LOCATION: [facility]  DATE/TIME: 6/5/2022 10:30 AM    INDICATION: SOB.  COMPARISON: None.      Impression    IMPRESSION: Negative chest.   XR Chest 2 Views    Narrative    XR CHEST 2  VW 6/7/2022 9:10 AM    HISTORY: sob    COMPARISON: 6/5/2022      Impression    IMPRESSION: New small left pleural effusion and mild left basilar  atelectasis/consolidation.. Right arm PICC tip at the SVC/right atrial  junction. Emphysema. No pneumothorax. Normal heart size. Bilateral  breast prosthesis.    DONALD MOHR MD         SYSTEM ID:  [id number]   US Lower Extremity Venous Duplex Bilateral    Narrative    VENOUS ULTRASOUND BILATERAL LEG(S)  6/7/2022 12:17 PM     HISTORY: Sob and leg pain rule out dvt    COMPARISON: None.    FINDINGS: Examination of the deep veins with graded compression and  color flow Doppler with spectral wave form analysis was performed.  Images show no evidence of thrombus in the bilateral common femoral  vein, femoral vein, popliteal vein or calf veins.      Impression    IMPRESSION: No deep vein thrombosis in either lower extremity.      BRYCE MCWILLIAMS DO         SYSTEM ID:  [id number]   NM Hepatobiliary Scan w GB EF    Narrative    EXAM: NM HEPATOBILIARY SCAN WITH GB EF  LOCATION: [facility]  DATE/TIME: 6/9/2022 1:04 PM    INDICATION: Right upper quadrant abdominal pain.  COMPARISON: Abdominal ultrasound dated 06/04/2022.  TECHNIQUE: 6.9 mCi of technetium-99m mebrofenin, IV. Anterior planar imaging of the abdomen. 0.9 mcg of cholecystokinin analog, IV. Gallbladder imaging for 30-60 minutes.    FINDINGS: Normal radionuclide activity in liver, gallbladder, bile ducts, and small bowel. No evidence of cystic or common duct obstruction or intrinsic liver disease. Gallbladder ejection fraction measures 36%, which is within the normal range of 35% or   greater.      Impression    IMPRESSION:     While technically negative for cholecystitis and biliary dyskinesia, the low ejection fraction suggests an element of gallbladder dysfunction.   Echocardiogram Complete     Value    LVEF  60-65%    Narrative    [id number]  [id number]  [id number]  [id number]^LAN^NICK^MARI      [facility]  Echocardiography Laboratory  [address] [place] [address]     Name: SERGIO BENTLEY  MRN: [id number]  : 1945  Study Date: 2022 01:18 PM  Age: 77 yrs  Gender: Female  Patient Location: [facility]  Reason For Study: SOB  Ordering Physician: NICK MONTENEGRO  Referring Physician: NICK MONTENEGRO  Performed By: RACHEL Esposito     BSA: 1.5 m2  Height: 67 in  Weight: 100 lb  HR: 69  BP: 120/59 mmHg  ______________________________________________________________________________  Procedure  Complete Portable Echo Adult. Optison (NDC #9016-1946) given intravenously.  ______________________________________________________________________________  Interpretation Summary     Left ventricular systolic function is normal.  The visual ejection fraction is 60-65%.  Grade I or early diastolic dysfunction.  The right ventricle is normal in structure, function and size.  No significant valve dysfunction.  The inferior vena cava was normal in size with preserved respiratory  variability.  No pericardial effusion.     No prior study for comparison.  ______________________________________________________________________________  Left Ventricle  The left ventricle is normal in size. There is normal left ventricular wall  thickness. Left ventricular systolic function is normal. The visual ejection  fraction is 60-65%. Grade I or early diastolic dysfunction. Normal left  ventricular wall motion.     Right Ventricle  The right ventricle is normal in structure, function and size.     Atria  Normal left atrial size. Right atrial size is normal.     Mitral Valve  There is mild mitral annular calcification. There is mild (1+) mitral  regurgitation.     Tricuspid Valve  The tricuspid valve is normal in structure and function. Right ventricular  systolic pressure could not be approximated due to inadequate tricuspid  regurgitation.     Aortic Valve  Normal tricuspid aortic valve.      Pulmonic Valve  The pulmonic valve is normal in structure and function.     Vessels  The aortic root is normal size. Normal size ascending aorta. The inferior vena  cava was normal in size with preserved respiratory variability.     Pericardium  There is no pericardial effusion.     ______________________________________________________________________________  MMode/2D Measurements & Calculations  IVSd: 0.74 cm  LVIDd: 3.8 cm  LVIDs: 2.6 cm  LVPWd: 0.77 cm  FS: 31.3 %  LV mass(C)d: 79.3 grams  LV mass(C)dI: 52.6 grams/m2     Ao root diam: 3.5 cm  LVOT diam: 2.1 cm  LVOT area: 3.5 cm2  LA Volume (BP): 48.6 ml  LA Volume Index (BP): 32.2 ml/m2     LA Volume Indexed (AL/bp): 33.1 ml/m2  RWT: 0.40     Doppler Measurements & Calculations  MV E max luis miguel: 61.4 cm/sec  MV A max luis miguel: 94.8 cm/sec  MV E/A: 0.65  MV dec time: 0.23 sec  LV V1 max P.9 mmHg  LV V1 max: 84.4 cm/sec  LV V1 VTI: 16.6 cm  SV(LVOT): 57.5 ml  SI(LVOT): 38.2 ml/m2  PA acc time: 0.18 sec  Pulm Sys Luis Miguel: 54.1 cm/sec  Pulm Nichole Luis Miguel: 60.6 cm/sec  Pulm A Revs Luis Miguel: 34.1 cm/sec  Pulm S/D: 0.89  E/E' av.8  Lateral E/e': 6.1  Medial E/e': 7.4     ______________________________________________________________________________  Report approved by: Vernell Dailey 2022 02:34 PM

## 2022-06-13 ENCOUNTER — PATIENT OUTREACH (OUTPATIENT)
Dept: CARE COORDINATION | Facility: CLINIC | Age: 77
End: 2022-06-13
Payer: COMMERCIAL

## 2022-06-13 DIAGNOSIS — Z71.89 OTHER SPECIFIED COUNSELING: ICD-10-CM

## 2022-06-13 LAB
PATH REPORT.COMMENTS IMP SPEC: NORMAL
PATH REPORT.COMMENTS IMP SPEC: NORMAL
PATH REPORT.FINAL DX SPEC: NORMAL
PATH REPORT.MICROSCOPIC SPEC OTHER STN: NORMAL
PATH REPORT.MICROSCOPIC SPEC OTHER STN: NORMAL

## 2022-06-13 NOTE — PROGRESS NOTES
"Clinic Care Coordination Contact  [facility]: Post-Discharge Note  SITUATION                                                      Admission:    Admission Date: 06/04/22   Reason for Admission: loose stools and C Diff  Discharge:   Discharge Date: 06/11/22  Discharge Diagnosis: loose stools and C Diff    BACKGROUND                                                      Per hospital discharge summary and inpatient provider notes:    Ms. Hicks is a 77-year-old female with a past medical history significant for chronic kidney disease, bilateral kidney stones, chronic obstructive pulmonary disease, who presents to the Emergency Department with 3 days of abdominal discomfort and loose stools, found to have pancolitis and possible cholecystitis.    ASSESSMENT      Enrollment  Primary Care Care Coordination Status: Not a Candidate    Discharge Assessment  How are you doing now that you are home?: \"I'm doing ok thank you\"  How are your symptoms? (Red Flag symptoms escalate to triage hotline per guidelines): Improved  Do you feel your condition is stable enough to be safe at home until your provider visit?: Yes  Does the patient have their discharge instructions? : Yes  Does the patient have questions regarding their discharge instructions? : No  Were you started on any new medications or were there changes to any of your previous medications? : Yes  Does the patient have all of their medications?: Yes  Do you have questions regarding any of your medications? : No  Do you have all of your needed medical supplies or equipment (DME)?  (i.e. oxygen tank, CPAP, cane, etc.): Yes  Discharge follow-up appointment scheduled within 14 calendar days? : No  Is patient agreeable to assistance with scheduling? : No                  PLAN                                                      Outpatient Plan:    F/up with PCP in 5 days with a preclinical BMP, CBC         No future appointments.      For any urgent concerns, please " contact our 24 hour nurse triage line: [phone] ([phone])         Jannie Alexandre  Community Health Worker  [facility]  [phone]

## 2022-07-05 ENCOUNTER — OFFICE VISIT (OUTPATIENT)
Dept: URGENT CARE | Facility: URGENT CARE | Age: 77
End: 2022-07-05
Payer: COMMERCIAL

## 2022-07-05 VITALS
RESPIRATION RATE: 16 BRPM | SYSTOLIC BLOOD PRESSURE: 120 MMHG | WEIGHT: 100 LBS | BODY MASS INDEX: 15.66 KG/M2 | DIASTOLIC BLOOD PRESSURE: 60 MMHG | TEMPERATURE: 98.9 F | OXYGEN SATURATION: 100 % | HEART RATE: 93 BPM

## 2022-07-05 DIAGNOSIS — R19.7 DIARRHEA, UNSPECIFIED TYPE: Primary | ICD-10-CM

## 2022-07-05 DIAGNOSIS — Z86.19 HISTORY OF CLOSTRIDIUM DIFFICILE INFECTION: ICD-10-CM

## 2022-07-05 PROCEDURE — 99203 OFFICE O/P NEW LOW 30 MIN: CPT | Performed by: FAMILY MEDICINE

## 2022-07-05 RX ORDER — VANCOMYCIN HYDROCHLORIDE 125 MG/1
125 CAPSULE ORAL 4 TIMES DAILY
Qty: 56 CAPSULE | Refills: 0 | Status: SHIPPED | OUTPATIENT
Start: 2022-07-05 | End: 2022-07-19

## 2022-07-05 RX ORDER — CALCIUM CARBONATE 500(1250)
TABLET ORAL
COMMUNITY
Start: 2022-06-18

## 2022-07-05 RX ORDER — HYDROCHLOROTHIAZIDE 25 MG/1
1 TABLET ORAL
COMMUNITY

## 2022-07-05 RX ORDER — ERGOCALCIFEROL 1.25 MG/1
50000 CAPSULE ORAL
COMMUNITY
Start: 2022-06-17

## 2022-07-05 RX ORDER — IBUPROFEN 200 MG
2 CAPSULE ORAL
COMMUNITY
Start: 2022-06-17

## 2022-07-06 ENCOUNTER — NURSE TRIAGE (OUTPATIENT)
Dept: NURSING | Facility: CLINIC | Age: 77
End: 2022-07-06

## 2022-07-06 LAB
C COLI+JEJUNI+LARI FUSA STL QL NAA+PROBE: NOT DETECTED
C DIFF TOX B STL QL: POSITIVE
EC STX1 GENE STL QL NAA+PROBE: NOT DETECTED
EC STX2 GENE STL QL NAA+PROBE: NOT DETECTED
NOROV GI+II ORF1-ORF2 JNC STL QL NAA+PR: NOT DETECTED
RVA NSP5 STL QL NAA+PROBE: NOT DETECTED
SALMONELLA SP RPOD STL QL NAA+PROBE: NOT DETECTED
SHIGELLA SP+EIEC IPAH STL QL NAA+PROBE: NOT DETECTED
V CHOL+PARA RFBL+TRKH+TNAA STL QL NAA+PR: NOT DETECTED
Y ENTERO RECN STL QL NAA+PROBE: NOT DETECTED

## 2022-07-06 PROCEDURE — 87506 IADNA-DNA/RNA PROBE TQ 6-11: CPT | Performed by: FAMILY MEDICINE

## 2022-07-06 PROCEDURE — 87493 C DIFF AMPLIFIED PROBE: CPT | Mod: 59 | Performed by: FAMILY MEDICINE

## 2022-07-06 NOTE — TELEPHONE ENCOUNTER
"Veda is calling about [facility] result: c diff positive.    Veda went to [facility] yesterday with new diarrhea after previous improvement from c diff infection. Lab is positive for c diff, which patient can see in MyChart, but has not been reviewed by a provider. Veda was wondering if there are any other recommendations besides taking the vancomycin that the [facility] provider ordered yesterday.    Reviewed AVS with patient, and that her results will be reviewed and someone will call her if there are any other recommendations. Her PCP is external; also recommended she call PCP for follow-up. No other questions or concerns.    Sherine Bishop RN  [place] Nurse Advisor  5:45 PM  7/6/2022    COVID 19 Nurse Triage Plan/Patient Instructions    Please be aware that novel coronavirus (COVID-19) may be circulating in the community. If you develop symptoms such as fever, cough, or SOB or if you have concerns about the presence of another infection including coronavirus (COVID-19), please contact your health care provider or visit [email or web address].     Disposition/Instructions    Additional COVID19 information to add for patients.   How can I protect others?  If you have symptoms (fever, cough, body aches or trouble breathing): Stay home and away from others (self-isolate) until:    At least 10 days have passed since your symptoms started, And     You ve had no fever--and no medicine that reduces fever--for 1 full day (24 hours), And      Your other symptoms have resolved (gotten better).     If you don t have symptoms, but a test showed that you have COVID-19 (you tested positive):    Stay home and away from others (self-isolate). Follow the tips under \"How do I self-isolate?\" below for 10 days (20 days if you have a weak immune system).    You don't need to be retested for COVID-19 before going back to school or work. As long as you're fever-free and feeling better, you can go back to school, work and other activities after " waiting the 10 or 20 days.     How do I self-isolate?    Stay in your own room, even for meals. Use your own bathroom if you can.     Stay away from others in your home. No hugging, kissing or shaking hands. No visitors.    Don t go to work, school or anywhere else.     Clean  high touch  surfaces often (doorknobs, counters, handles, etc.). Use a household cleaning spray or wipes. You ll find a full list on the EPA website:  www.epa.gov/pesticide-registration/list-n-disinfectants-use-against-sars-cov-2.    Cover your mouth and nose with a mask, tissue or washcloth to avoid spreading germs.    Wash your hands and face often. Use soap and water.    Caregivers in these groups are at risk for severe illness due to COVID-19:  o People 65 years and older  o People who live in a nursing home or long-term care facility  o People with chronic disease (lung, heart, cancer, diabetes, kidney, liver, immunologic)  o People who have a weakened immune system, including those who:  - Are in cancer treatment  - Take medicine that weakens the immune system, such as corticosteroids  - Had a bone marrow or organ transplant  - Have an immune deficiency  - Have poorly controlled HIV or AIDS  - Are obese (body mass index of 40 or higher)  - Smoke regularly    Caregivers should wear gloves while washing dishes, handling laundry and cleaning bedrooms and bathrooms.    Use caution when washing and drying laundry: Don t shake dirty laundry, and use the warmest water setting that you can.    For more tips, go to www.cdc.gov/coronavirus/2019-ncov/downloads/10Things.pdf.    How can I take care of myself?  1. Get lots of rest. Drink extra fluids (unless a doctor has told you not to).     2. Take Tylenol (acetaminophen) for fever or pain. If you have liver or kidney problems, ask your family doctor if it s okay to take Tylenol.     Adults can take either:     650 mg (two 325 mg pills) every 4 to 6 hours, or     1,000 mg (two 500 mg pills) every 8  hours as needed.     Note: Don t take more than 3,000 mg in one day.   Acetaminophen is found in many medicines (both prescribed and over-the-counter medicines). Read all labels to be sure you don t take too much.     For children, check the Tylenol bottle for the right dose. The dose is based on the child s age or weight.    3. If you have other health problems (like cancer, heart failure, an organ transplant or severe kidney disease): Call your specialty clinic if you don t feel better in the next 2 days.    4. Know when to call 911: Emergency warning signs include:    Trouble breathing or shortness of breath    Pain or pressure in the chest that doesn t go away    Feeling confused like you haven t felt before, or not being able to wake up    Bluish-colored lips or face    What are the symptoms of COVID-19?     The most common symptoms are cough, fever and trouble breathing.     Less common symptoms include body aches, chills, diarrhea (loose, watery poops), fatigue (feeling very tired), headache, runny nose, sore throat and loss of smell.    COVID-19 can cause severe coughing (bronchitis) and lung infection (pneumonia).    How does it spread?     The virus may spread when a person coughs or sneezes into the air. The virus can travel about 6 feet this way, and it can live on surfaces.      Common  (household disinfectants) will kill the virus.    Who is at risk?  Anyone can catch COVID-19 if they re around someone who has the virus.    How can others protect themselves?     Stay away from people who have COVID-19 (or symptoms of COVID-19).    Wash hands often with soap and water. Or, use hand  with at least 60% alcohol.    Avoid touching the eyes, nose or mouth.     Wear a face mask when you go out in public, when sick or when caring for a sick person.    Where can I get more information?    [facility]: About COVID-19: [email or web address]    CDC: What to Do If You re Sick:  www.cdc.gov/coronavirus/2019-ncov/about/steps-when-sick.html    CDC: Ending Home Isolation: www.cdc.gov/coronavirus/2019-ncov/hcp/disposition-in-home-patients.html     CDC: Caring for Someone: www.cdc.gov/coronavirus/2019-ncov/if-you-are-sick/care-for-someone.html     [facility]: Interim Guidance for Hospital Discharge to Home: www.[facility]/diseases/coronavirus/hcp/hospdischarge.pdf    [facility] clinical trials (COVID-19 research studies): [email or web address]-clinical-trials     Below are the COVID-19 hotlines at the [place] Department of [facility] ([facility]). Interpreters are available.   o For health questions: Call 986-432-3070 or 1-332.283.4052 (7 a.m. to 7 p.m.)  o For questions about schools and childcare: Call 726-653-4546 or 1-995.937.1966 (7 a.m. to 7 p.m.)          Thank you for taking steps to prevent the spread of this virus.  o Limit your contact with others.  o Wear a simple mask to cover your cough.  o Wash your hands well and often.    [facility]: About COVID-19: [email or web address]    CDC: What to Do If You're Sick: www.cdc.gov/coronavirus/2019-ncov/about/steps-when-sick.html    CDC: Ending Home Isolation: www.cdc.gov/coronavirus/2019-ncov/hcp/disposition-in-home-patients.html     CDC: Caring for Someone: www.cdc.gov/coronavirus/2019-ncov/if-you-are-sick/care-for-someone.html     [facility]: Interim Guidance for Hospital Discharge to Home: www.[facility]/diseases/coronavirus/hcp/hospdischarge.pdf    [facility] clinical trials (COVID-19 research studies): [email or web address]-clinical-trials     Below are the COVID-19 hotlines at the [place] Department [facility] ([facility]). Interpreters are available.   o For health questions: Call 156-258-9837 or 1-658.798.8968 (7 a.m. to 7 p.m.)  o For questions about schools and childcare: Call 239-955-7447 or 1-217.168.9003 (7 a.m. to 7 p.m.)                   Reason for Disposition    Caller  requesting lab results    Protocols used: PCP CALL - NO TRIAGE-A-AH

## 2022-07-11 ENCOUNTER — OFFICE VISIT (OUTPATIENT)
Dept: URGENT CARE | Facility: URGENT CARE | Age: 77
End: 2022-07-11
Payer: COMMERCIAL

## 2022-07-11 VITALS
RESPIRATION RATE: 20 BRPM | SYSTOLIC BLOOD PRESSURE: 134 MMHG | OXYGEN SATURATION: 100 % | WEIGHT: 107 LBS | TEMPERATURE: 98 F | BODY MASS INDEX: 16.76 KG/M2 | HEART RATE: 80 BPM | DIASTOLIC BLOOD PRESSURE: 83 MMHG

## 2022-07-11 DIAGNOSIS — M54.2 NECK PAIN: ICD-10-CM

## 2022-07-11 DIAGNOSIS — M25.9 MULTIPLE JOINT COMPLAINTS: Primary | ICD-10-CM

## 2022-07-11 DIAGNOSIS — N18.9 CHRONIC KIDNEY DISEASE, UNSPECIFIED CKD STAGE: ICD-10-CM

## 2022-07-11 LAB
ANION GAP SERPL CALCULATED.3IONS-SCNC: 9 MMOL/L (ref 3–14)
B BURGDOR IGG+IGM SER QL: 0.02
BASOPHILS # BLD MANUAL: 0.1 10E3/UL (ref 0–0.2)
BASOPHILS NFR BLD MANUAL: 1 %
BUN SERPL-MCNC: 27 MG/DL (ref 7–30)
CALCIUM SERPL-MCNC: 10.2 MG/DL (ref 8.5–10.1)
CHLORIDE BLD-SCNC: 112 MMOL/L (ref 94–109)
CO2 SERPL-SCNC: 21 MMOL/L (ref 20–32)
CREAT SERPL-MCNC: 1.9 MG/DL (ref 0.52–1.04)
EOSINOPHIL # BLD MANUAL: 1.2 10E3/UL (ref 0–0.7)
EOSINOPHIL NFR BLD MANUAL: 9 %
ERYTHROCYTE [DISTWIDTH] IN BLOOD BY AUTOMATED COUNT: 14.1 % (ref 10–15)
ERYTHROCYTE [SEDIMENTATION RATE] IN BLOOD BY WESTERGREN METHOD: 30 MM/HR (ref 0–30)
GFR SERPL CREATININE-BSD FRML MDRD: 27 ML/MIN/1.73M2
GLUCOSE BLD-MCNC: 96 MG/DL (ref 70–99)
HCT VFR BLD AUTO: 38.4 % (ref 35–47)
HGB BLD-MCNC: 11.6 G/DL (ref 11.7–15.7)
LYMPHOCYTES # BLD MANUAL: 1.5 10E3/UL (ref 0.8–5.3)
LYMPHOCYTES NFR BLD MANUAL: 11 %
MCH RBC QN AUTO: 29.6 PG (ref 26.5–33)
MCHC RBC AUTO-ENTMCNC: 30.2 G/DL (ref 31.5–36.5)
MCV RBC AUTO: 98 FL (ref 78–100)
MONOCYTES # BLD MANUAL: 1.1 10E3/UL (ref 0–1.3)
MONOCYTES NFR BLD MANUAL: 8 %
MYELOCYTES # BLD MANUAL: 0.1 10E3/UL
MYELOCYTES NFR BLD MANUAL: 1 %
NEUTROPHILS # BLD MANUAL: 9.6 10E3/UL (ref 1.6–8.3)
NEUTROPHILS NFR BLD MANUAL: 70 %
PLAT MORPH BLD: ABNORMAL
PLATELET # BLD AUTO: 365 10E3/UL (ref 150–450)
POLYCHROMASIA BLD QL SMEAR: SLIGHT
POTASSIUM BLD-SCNC: 4.1 MMOL/L (ref 3.4–5.3)
RBC # BLD AUTO: 3.92 10E6/UL (ref 3.8–5.2)
RBC MORPH BLD: ABNORMAL
SODIUM SERPL-SCNC: 142 MMOL/L (ref 133–144)
WBC # BLD AUTO: 13.7 10E3/UL (ref 4–11)

## 2022-07-11 PROCEDURE — 85652 RBC SED RATE AUTOMATED: CPT | Performed by: PHYSICIAN ASSISTANT

## 2022-07-11 PROCEDURE — 80048 BASIC METABOLIC PNL TOTAL CA: CPT | Performed by: PHYSICIAN ASSISTANT

## 2022-07-11 PROCEDURE — 99214 OFFICE O/P EST MOD 30 MIN: CPT | Performed by: PHYSICIAN ASSISTANT

## 2022-07-11 PROCEDURE — 99000 SPECIMEN HANDLING OFFICE-LAB: CPT | Performed by: PHYSICIAN ASSISTANT

## 2022-07-11 PROCEDURE — 85007 BL SMEAR W/DIFF WBC COUNT: CPT | Performed by: PHYSICIAN ASSISTANT

## 2022-07-11 PROCEDURE — 86666 EHRLICHIA ANTIBODY: CPT | Mod: 90 | Performed by: PHYSICIAN ASSISTANT

## 2022-07-11 PROCEDURE — 36415 COLL VENOUS BLD VENIPUNCTURE: CPT | Performed by: PHYSICIAN ASSISTANT

## 2022-07-11 PROCEDURE — 85027 COMPLETE CBC AUTOMATED: CPT | Performed by: PHYSICIAN ASSISTANT

## 2022-07-11 PROCEDURE — 86618 LYME DISEASE ANTIBODY: CPT | Performed by: PHYSICIAN ASSISTANT

## 2022-07-11 RX ORDER — METHYLPREDNISOLONE 4 MG
TABLET, DOSE PACK ORAL
Qty: 21 TABLET | Refills: 0 | Status: SHIPPED | OUTPATIENT
Start: 2022-07-11

## 2022-07-11 RX ORDER — TIZANIDINE 2 MG/1
2 TABLET ORAL 2 TIMES DAILY PRN
Qty: 14 TABLET | Refills: 0 | Status: SHIPPED | OUTPATIENT
Start: 2022-07-11 | End: 2022-07-18

## 2022-07-11 NOTE — PROGRESS NOTES
Assessment & Plan     Multiple joint complaints    ESR normal  CBC is elevated still, she has had a elevated since being treated for C DIff  Recheck WBC with PCP  CMP positive for elevated creatinine    Medrol for course of joint inflammation and pain  Neck pain as well    - ESR: Erythrocyte sedimentation rate; Future  - Lyme Disease Total Abs Bld with Reflex to Confirm CLIA; Future  - Ehrlichia chaffeenis Abys IgG and IgM; Future  - CBC with platelets and differential; Future  - ESR: Erythrocyte sedimentation rate  - Lyme Disease Total Abs Bld with Reflex to Confirm CLIA  - Ehrlichia chaffeenis Abys IgG and IgM  - CBC with platelets and differential  - methylPREDNISolone (MEDROL DOSEPAK) 4 MG tablet therapy pack; Follow package instructions    Neck pain DDD of cervical spine    Spinal disks are gel-filled cushions between the bones, or vertebrae, of the spine. The disks act like shock absorbers. Over time, the disks may break down. This is called degenerative disk disease.  This condition can affect the neck or back. It is one of the most common causes of low back pain. The pain often remains localized to the lower back or neck. Muscle spasm is often present and adds to the pain.  Disk degeneration is a natural part of aging. But it is not painful for most people. It may also occur as a result of repeated minor injuries due to daily activities, sports, or accidents. It may lead to osteoarthritis of the spine. Back pain related to disk disease may come and go. Or it may become chronic and last for months or years. The disk may bulge or rupture. This is called a slipped disk or herniated disk. That can put pressure on a nearby spinal nerve and cause neck or back pain that spreads down one arm or leg.  X-rays, CT scan, or an MRI scan may help to diagnose this condition. For acute pain, treatment includes anti-inflammatory medicines, muscle relaxants, rest, ice, or heat. Strong prescription pain medicines, called  opioids, may be needed for short-term treatment if pain suddenly gets worse. Opioid medicines can be addictive. So they are not advised for long-term pain management. Other types of medicines are preferred. Surgery is generally not used to treat this condition unless there is a complication.      - XR Cervical Spine 2/3 Views; Future  - methylPREDNISolone (MEDROL DOSEPAK) 4 MG tablet therapy pack; Follow package instructions  - tiZANidine (ZANAFLEX) 2 MG tablet; Take 1 tablet (2 mg) by mouth 2 times daily as needed for muscle spasms    Chronic kidney disease, unspecified CKD stage    Creatinine if elevated at 1.9  Avoid NSAIDS  Advise follow up with PCP    - Basic metabolic panel  (Ca, Cl, CO2, Creat, Gluc, K, Na, BUN); Future  - Basic metabolic panel  (Ca, Cl, CO2, Creat, Gluc, K, Na, BUN)    Review of external notes as documented elsewhere in note         Tobacco Cessation:   reports that she has been smoking. She has a 47.00 pack-year smoking history. She has never used smokeless tobacco.      At today's visit with Veda Hicks , we discussed results, diagnosis, medications and formulated a plan.  We also discussed red flags for immediate return to clinic/ER, as well as indications for follow up if no improvement. Patient understood and agreed to plan. Veda Hicks was discharged with stable vitals and has no further questions.       No follow-ups on file.    Paul Marquez, [facility], PA-KAYLIE  [facility]    Lydia Mccollum is a 77 year old, presenting for the following health issues:  Musculoskeletal Problem (Joint pain thru-out body and stiff neck for couple days)      HPI     Veda Hicks, 77 year old, female presents to the urgent care today with:   Musculoskeletal Problem (Joint pain thru-out body and stiff neck for couple days)      Review of Systems   Constitutional, HEENT, cardiovascular, pulmonary, GI, , musculoskeletal, neuro, skin, endocrine and psych systems are negative,  except as otherwise noted.      Objective    /83   Pulse 80   Temp 98  F (36.7  C)   Resp 20   Wt 48.5 kg (107 lb)   SpO2 100%   BMI 16.76 kg/m    Body mass index is 16.76 kg/m .  Physical Exam   GENERAL: healthy, alert and no distress  EYES: Eyes grossly normal to inspection, PERRL and conjunctivae and sclerae normal  HENT: ear canals and TM's normal, nose and mouth without ulcers or lesions  NECK: Positive for posterior neck tenderness, tightness of muscles  RESP: lungs clear to auscultation - no rales, rhonchi or wheezes  CV: regular rate and rhythm, normal S1 S2, no S3 or S4, no murmur, click or rub, no peripheral edema and peripheral pulses strong  ABDOMEN: soft, nontender, no hepatosplenomegaly, no masses and bowel sounds normal  MS: Positive for multiple joint aches and tenderness  SKIN: no suspicious lesions or rashes  NEURO: Normal strength and tone, mentation intact and speech normal  PSYCH: mentation appears normal, affect normal/bright                    .  ..

## 2022-07-15 LAB
E CHAFFEENSIS IGG TITR SER IF: NORMAL {TITER}
E CHAFFEENSIS IGM TITR SER IF: NORMAL {TITER}

## 2022-07-19 NOTE — PROGRESS NOTES
SUBJECTIVE: Veda Hicks is a 77 year old female presenting with a chief complaint of diarrhea.  Onset of symptoms was day(s) ago.  Course of illness is worsening.      Past Medical History:   Diagnosis Date     Chronic kidney disease     kidney stones     No Known Allergies  Social History     Tobacco Use     Smoking status: Current Every Day Smoker     Packs/day: 1.00     Years: 47.00     Pack years: 47.00     Smokeless tobacco: Never Used   Substance Use Topics     Alcohol use: No       ROS:  SKIN: no rash  GI: no vomiting    OBJECTIVE:  /60   Pulse 93   Temp 98.9  F (37.2  C) (Tympanic)   Resp 16   Wt 45.4 kg (100 lb)   SpO2 100%   BMI 15.66 kg/m  GENERAL APPEARANCE: healthy, alert and no distress  ABDOMEN:  soft, nontender, no HSM or masses and bowel sounds normal  SKIN: no suspicious lesions or rashes      ICD-10-CM    1. Diarrhea, unspecified type  R19.7 Clostridium difficile Toxin B PCR     vancomycin (VANCOCIN) 125 MG capsule     Enteric Bacteria and Virus Panel by [facility] Stool   2. History of Clostridium difficile infection  Z86.19 Clostridium difficile Toxin B PCR     vancomycin (VANCOCIN) 125 MG capsule     Enteric Bacteria and Virus Panel by [facility] Stool       Fluids/Rest, f/u if worse/not any better

## 2022-09-15 ENCOUNTER — WEB ENCOUNTER (OUTPATIENT)
Dept: URBAN - METROPOLITAN AREA CLINIC 128 | Facility: CLINIC | Age: 77
End: 2022-09-15

## 2022-09-19 ENCOUNTER — OFFICE VISIT (OUTPATIENT)
Dept: URBAN - METROPOLITAN AREA CLINIC 128 | Facility: CLINIC | Age: 77
End: 2022-09-19
Payer: MEDICARE

## 2022-09-19 ENCOUNTER — DASHBOARD ENCOUNTERS (OUTPATIENT)
Age: 77
End: 2022-09-19

## 2022-09-19 VITALS
DIASTOLIC BLOOD PRESSURE: 64 MMHG | BODY MASS INDEX: 17.27 KG/M2 | HEIGHT: 67 IN | WEIGHT: 110 LBS | TEMPERATURE: 97.8 F | SYSTOLIC BLOOD PRESSURE: 118 MMHG

## 2022-09-19 DIAGNOSIS — A04.72 C. DIFFICILE COLITIS: ICD-10-CM

## 2022-09-19 PROCEDURE — 99203 OFFICE O/P NEW LOW 30 MIN: CPT | Performed by: INTERNAL MEDICINE

## 2022-09-19 NOTE — HPI-TODAY'S VISIT:
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  She has just completed her 3rd course of vancomycin.  She has not taken any other antibiotics recently.  BMs are now normal and tending toward her baseline mild constipation.  No bleeding.  Weightr stable. Last colonoscopy reported normal 2 years ago in [place].  No personal history of colon polyps or colon cancer.  No UGI symptoms.

## 2022-10-14 ENCOUNTER — OFFICE VISIT (OUTPATIENT)
Dept: URBAN - METROPOLITAN AREA CLINIC 128 | Facility: CLINIC | Age: 77
End: 2022-10-14

## 2022-10-29 ENCOUNTER — HEALTH MAINTENANCE LETTER (OUTPATIENT)
Age: 77
End: 2022-10-29

## 2022-11-16 ENCOUNTER — ERX REFILL RESPONSE (OUTPATIENT)
Dept: URBAN - METROPOLITAN AREA CLINIC 128 | Facility: CLINIC | Age: 77
End: 2022-11-16

## 2022-11-16 RX ORDER — VANCOMYCIN HYDROCHLORIDE 125 MG/1
TAKE 1 CAPSULE BY MOUTH FOUR TIMES A DAY FOR 14 DAYS THEN 3 TIMES A DAY FOR 14 DAYS TWICE A DAY FOR 14 DAYS THEN EVERY DAY FOR 14 DAYS THEN EVERY OTHER DAY FOR 14 DAYS CAPSULE ORAL
Qty: 56 CAPSULE | Refills: 2 | OUTPATIENT

## 2022-11-16 RX ORDER — VANCOMYCIN HYDROCHLORIDE 125 MG/1
TAKE 1 CAPSULE BY MOUTH FOUR TIMES A DAY FOR 14 DAYS THEN 3 TIMES A DAY FOR 14 DAYS TWICE A DAY FOR 14 DAYS THEN EVERY DAY FOR 14 DAYS THEN EVERY OTHER DAY FOR 14 DAYS CAPSULE ORAL
Qty: 56 CAPSULE | Refills: 3 | OUTPATIENT

## 2023-11-12 ENCOUNTER — HEALTH MAINTENANCE LETTER (OUTPATIENT)
Age: 78
End: 2023-11-12

## 2024-12-28 ENCOUNTER — HEALTH MAINTENANCE LETTER (OUTPATIENT)
Age: 79
End: 2024-12-28

## 2025-05-19 NOTE — CONSULTS
"BRIEF NUTRITION ASSESSMENT      REASON FOR ASSESSMENT:  Veda Hicks is a 77 year old female seen by Registered Dietitian for [facility]    NUTRITION HISTORY:  - Information obtained from patient at bedside this morning. She was quite frustrated upon arrival so interview was limited.   - Patient states she is \"not a great eater\" at home. Lives by herself and mostly does convenience foods like canned soup or will just skip eating all together. Has tried Ensure but doesn't like it, has not tried any other supplements.   - No known food allergies noted.     CURRENT DIET AND INTAKE:  Diet:  Low Fiber  (started yesterday)  6/5: Clear Liquid Diet initiated  6/8: advanced to Full Liquid Diet  6/9: advanced to Low Fiber Diet             Fair appetite per chart review. Limited PO being documented in flowsheets. Spoke with patient at bedside this morning to provide low fiber room service menu and offer PO encouragement. States her abdominal pain is virtually gone and her appetite is better. Had some breakfast this morning already. Declines offer of supplements despite encouragement stating she is irritated and wants to go home - \"they have done every test possible here and still can't find anything, I'm just going home\".     ANTHROPOMETRICS:  Height: 5' 7\"  Weight:  100 lbs 0 oz  Body mass index is 15.66 kg/m .   Weight Status: Underweight BMI <18.5  IBW:  135 lb  %IBW: 74%  Weight History: Overall down 5 lb in the past 6 months (5% weight loss) which is not significant.   Wt Readings from Last 10 Encounters:   06/04/22 45.4 kg (100 lb)   06/21/18 48.5 kg (107 lb)   12/09/16 51.3 kg (113 lb 3 oz)   08/10/15 50.8 kg (112 lb)   05/22/15 54.4 kg (120 lb)   01/26/15 54.4 kg (120 lb)   12/12/14 52.7 kg (116 lb 2 oz)   11/24/14 53.5 kg (118 lb)   11/11/14 54.4 kg (120 lb)     Per Care Everywhere --    47.9 kg (105 lb 8 oz) 12/17/2021     LABS:  Labs noted  C diff+     HIDA scan this morning negative for cystic duct obstruction but " Radha, please ask him to increase losartan to 75 mg, send an updated prescription, have him report back new blood pressures when I see him in June.  Thank you.   consistent with dyskinesia     MALNUTRITION:  Visual Nutrition Focused Physical Assessment (NFPA) completed.   Patient does not meet two of the following criteria necessary for diagnosing malnutrition.     % Weight Loss:  Weight loss does not meet criteria for malnutrition   % Intake:  <75% for > 7 days (moderate malnutrition)  Subcutaneous Fat Loss:  Suspect baseline   Muscle Loss:  Suspect baseline   Fluid Retention:  None noted    NUTRITION INTERVENTION:  Nutrition Diagnosis:  Inadequate oral intake related to recent diet advancement as evidenced by liquid diet x 5 days of admission with recent advancement to low fiber, eating 50-75% of meals per flowsheets when PO documented     Implementation:  Nutrition Education: Introduced RD and role of care. Encouraged PO intake. Discussed nutritional needs. Offered various supplements but patient declined at this time.     Goals:  Intake of >/= 75% of meals TID     FOLLOW UP/MONITORING:   Will re-evaluate in 7 days, or sooner, if re-consulted.    Emma Lima RD, LD